# Patient Record
Sex: MALE | Race: WHITE | Employment: FULL TIME | URBAN - METROPOLITAN AREA
[De-identification: names, ages, dates, MRNs, and addresses within clinical notes are randomized per-mention and may not be internally consistent; named-entity substitution may affect disease eponyms.]

---

## 2022-03-19 PROBLEM — R94.111: Status: ACTIVE | Noted: 2022-02-15

## 2022-03-19 PROBLEM — Z23 ENCOUNTER FOR IMMUNIZATION: Status: ACTIVE | Noted: 2022-02-15

## 2022-03-19 PROBLEM — Z12.11 COLON CANCER SCREENING: Status: ACTIVE | Noted: 2022-02-15

## 2022-03-19 PROBLEM — Z12.5 PROSTATE CANCER SCREENING: Status: ACTIVE | Noted: 2022-02-15

## 2022-05-13 PROBLEM — L02.214 ABSCESS OF LEFT GROIN: Status: ACTIVE | Noted: 2022-05-13

## 2022-05-13 PROBLEM — D69.6 THROMBOCYTOPENIA (HCC): Status: ACTIVE | Noted: 2022-05-13

## 2022-06-12 PROBLEM — Z12.5 PROSTATE CANCER SCREENING: Status: RESOLVED | Noted: 2022-02-15 | Resolved: 2022-06-12

## 2022-06-16 DIAGNOSIS — Z11.59 NEED FOR HEPATITIS C SCREENING TEST: ICD-10-CM

## 2022-06-16 DIAGNOSIS — D69.6 THROMBOCYTOPENIA, UNSPECIFIED (HCC): Primary | ICD-10-CM

## 2022-06-16 DIAGNOSIS — Z79.4 TYPE 2 DIABETES MELLITUS WITHOUT COMPLICATION, WITH LONG-TERM CURRENT USE OF INSULIN (HCC): ICD-10-CM

## 2022-06-16 DIAGNOSIS — E11.9 TYPE 2 DIABETES MELLITUS WITHOUT COMPLICATION, WITH LONG-TERM CURRENT USE OF INSULIN (HCC): ICD-10-CM

## 2022-06-21 NOTE — PROGRESS NOTES
Nunu Grover MD   Bariatric & Advanced Laparoscopic Surgery & Endoscopy  [address] [facility] [address], [address] [facility]  Narciso Lordbreonna Reid  Phone [phone]   Fax [phone]      Date of visit: 2022      Primary/Requesting provider: Travis Catherine MD         Name: Debra Bay      MRN: [id number]       : 1955       Age: 61 y.o. Sex: male        PCP: Travis Catherine MD     CC:    Chief Complaint   Patient presents with    Follow-up     FU - Boil of inguinal region        HPI:    Debra Bay is a 61 y.o. male who presents for evaluation of a groin boil that appeared about 2 weeks ago. He reports the boil was getting lager in size. He reports the area was red, warm and painful. pain was 7/10. No fever or chills. He reports going to the ER this past weekend and they lanced the abscess, cleaned it and packed it. He reports having this about a year ago and was treated with antibiotics only. He is here for wound check and discuss possible chances of recurrence. 2022 - he is here for follow up. He reports his scrotal area is almost completely resolved. He completed antibiotics. He does reports a NEW problem. He was on vacation and scratch his right lower leg on a boat. He is diabetic. He rpeorts he cleaned the area immediately. He reports minimal clear drainage.        PMH:    Past Medical History:   Diagnosis Date    Diabetes mellitus, type 2 ([address]     ED (erectile dysfunction)     Hyperlipidemia associated with type 2 diabetes mellitus ([facility]     Hypertension        PSH:    Past Surgical History:   Procedure Laterality Date    WISDOM TOOTH EXTRACTION         MEDS:    Current Outpatient Medications   Medication Sig Dispense Refill    Semaglutide (OZEMPIC, 0.25 OR 0.5 MG/DOSE, SC) Inject into the skin once a week      Multiple Vitamin (MULTI-VITAMIN DAILY PO) Take by mouth daily      amoxicillin-clavulanate (AUGMENTIN) 875-125 MG per tablet Take 1 tablet by mouth 2 times daily for 7 days 14 tablet 0    atorvastatin (LIPITOR) 40 MG tablet Take by mouth daily      lisinopril (PRINIVIL;ZESTRIL) 40 MG tablet Take 40 mg by mouth daily      metFORMIN (GLUCOPHAGE) 1000 MG tablet Take 1,000 mg by mouth 2 times daily      sildenafil (VIAGRA) 100 MG tablet Take 25 mg by mouth daily as needed      SITagliptin (JANUVIA) 100 MG tablet Take 100 mg by mouth daily (Patient not taking: Reported on 6/28/2022)       No current facility-administered medications for this visit. ALLERGIES:      Allergies   Allergen Reactions    Sulfamethoxazole-Trimethoprim Swelling       SH:    Social History     Tobacco Use    Smoking status: Never Smoker    Smokeless tobacco: Never Used   Substance Use Topics    Alcohol use: Not Currently    Drug use: Never       FH:    Family History   Problem Relation Age of Onset    Heart Failure Mother     Prostate Cancer Father        Review of systems:  Review of Systems   Constitutional: Negative for chills, fatigue and fever. HENT: Negative for ear pain, sinus pain and sore throat. Eyes: Negative for photophobia, discharge and itching. Respiratory: Negative. Cardiovascular: Negative. Gastrointestinal: Negative. Endocrine: Negative. Genitourinary: Negative. Musculoskeletal: Negative. Allergic/Immunologic: Negative. Neurological: Negative. Hematological: Negative. Psychiatric/Behavioral: Negative. Physical Exam:     /73   Pulse 87   Wt 204 lb 11.2 oz (92.9 kg)   BMI 31.12 kg/m²     General:  Well-developed, well-nourished, no distress. Psych:  Cooperative, good insight and judgement. Neuro:  Alert, oriented to person, place and time. HEENT:  Normocephalic, atraumatic. Sclera clear. Lungs:  Unlabored breathing. Symmetrical chest expansion. Chest wall:  No tenderness or deformity. Heart:  Regular rate and rhythm. No JVD. Genitalia -- deferred by patient.    Extremities:  RLE with over 10 cm long scratch with an area of scab and surrounding cellulitis. + red. Mildly tender. No fluctuance  Skin:  Skin color, texture, turgor normal. No rashes. Diagnosis Orders   1. Abrasion of right lower extremity, initial encounter     2. Cellulitis of right leg           Assessment/Plan:  Julien Lennon is a 61 y.o. male who has signs and symptoms consistent with  Right lower extremity abrasion and cellulitis    Patient instructed to keep area clean and free of any lotions or chemicals. Augmentin script sent x 7 days for cellulitis. Patient should follow up with PCP if cellulitis worsens. Time: I spent 30 minutes preparing to see patient (including chart review and preparation), obtaining and/or reviewing additional medical history, performing a physical exam and evaluation, documenting clinical information in the electronic health record, independently interpreting results, communicating results to patient, family or caregiver, and/or coordinating care.       Signed: Christy Stevens MD  Bariatric & Minimally Invasive Surgery  6/28/2022 6:23 PM

## 2022-06-28 ENCOUNTER — OFFICE VISIT (OUTPATIENT)
Dept: SURGERY | Age: 67
End: 2022-06-28
Payer: COMMERCIAL

## 2022-06-28 VITALS
WEIGHT: 204.7 LBS | BODY MASS INDEX: 31.12 KG/M2 | HEART RATE: 87 BPM | DIASTOLIC BLOOD PRESSURE: 73 MMHG | SYSTOLIC BLOOD PRESSURE: 134 MMHG

## 2022-06-28 DIAGNOSIS — L03.115 CELLULITIS OF RIGHT LEG: ICD-10-CM

## 2022-06-28 DIAGNOSIS — S80.811A ABRASION OF RIGHT LOWER EXTREMITY, INITIAL ENCOUNTER: Primary | ICD-10-CM

## 2022-06-28 PROCEDURE — 99214 OFFICE O/P EST MOD 30 MIN: CPT | Performed by: SURGERY

## 2022-06-28 RX ORDER — AMOXICILLIN AND CLAVULANATE POTASSIUM 875; 125 MG/1; MG/1
1 TABLET, FILM COATED ORAL 2 TIMES DAILY
Qty: 14 TABLET | Refills: 0 | Status: SHIPPED | OUTPATIENT
Start: 2022-06-28 | End: 2022-07-05

## 2022-06-28 ASSESSMENT — ENCOUNTER SYMPTOMS
EYE ITCHING: 0
RESPIRATORY NEGATIVE: 1
SINUS PAIN: 0
ALLERGIC/IMMUNOLOGIC NEGATIVE: 1
EYE DISCHARGE: 0
SORE THROAT: 0
PHOTOPHOBIA: 0
GASTROINTESTINAL NEGATIVE: 1

## 2022-07-25 DIAGNOSIS — I10 ESSENTIAL (PRIMARY) HYPERTENSION: ICD-10-CM

## 2022-07-25 RX ORDER — LISINOPRIL 40 MG/1
TABLET ORAL
Qty: 90 TABLET | Refills: 1 | Status: SHIPPED | OUTPATIENT
Start: 2022-07-25 | End: 2022-08-05 | Stop reason: SDUPTHER

## 2022-07-29 DIAGNOSIS — Z79.4 TYPE 2 DIABETES MELLITUS WITHOUT COMPLICATION, WITH LONG-TERM CURRENT USE OF INSULIN (HCC): ICD-10-CM

## 2022-07-29 DIAGNOSIS — E11.9 TYPE 2 DIABETES MELLITUS WITHOUT COMPLICATION, WITH LONG-TERM CURRENT USE OF INSULIN (HCC): ICD-10-CM

## 2022-07-29 DIAGNOSIS — Z11.59 NEED FOR HEPATITIS C SCREENING TEST: ICD-10-CM

## 2022-07-29 DIAGNOSIS — D69.6 THROMBOCYTOPENIA, UNSPECIFIED (HCC): ICD-10-CM

## 2022-07-29 LAB
ANION GAP SERPL CALC-SCNC: 8 MMOL/L (ref 7–16)
BASOPHILS # BLD: 0.1 K/UL (ref 0–0.2)
BASOPHILS NFR BLD: 1 % (ref 0–2)
BUN SERPL-MCNC: 17 MG/DL (ref 8–23)
CALCIUM SERPL-MCNC: 9.2 MG/DL (ref 8.3–10.4)
CHLORIDE SERPL-SCNC: 102 MMOL/L (ref 98–107)
CO2 SERPL-SCNC: 25 MMOL/L (ref 21–32)
CREAT SERPL-MCNC: 1.1 MG/DL (ref 0.8–1.5)
DIFFERENTIAL METHOD BLD: ABNORMAL
EOSINOPHIL # BLD: 0.1 K/UL (ref 0–0.8)
EOSINOPHIL NFR BLD: 2 % (ref 0.5–7.8)
ERYTHROCYTE [DISTWIDTH] IN BLOOD BY AUTOMATED COUNT: 12.6 % (ref 11.9–14.6)
GLUCOSE SERPL-MCNC: 292 MG/DL (ref 65–100)
HCT VFR BLD AUTO: 46.4 % (ref 41.1–50.3)
HCV AB SER QL: NONREACTIVE
HGB BLD-MCNC: 15.2 G/DL (ref 13.6–17.2)
IMM GRANULOCYTES # BLD AUTO: 0.1 K/UL (ref 0–0.5)
IMM GRANULOCYTES NFR BLD AUTO: 1 % (ref 0–5)
LYMPHOCYTES # BLD: 1.3 K/UL (ref 0.5–4.6)
LYMPHOCYTES NFR BLD: 22 % (ref 13–44)
MCH RBC QN AUTO: 30.3 PG (ref 26.1–32.9)
MCHC RBC AUTO-ENTMCNC: 32.8 G/DL (ref 31.4–35)
MCV RBC AUTO: 92.4 FL (ref 79.6–97.8)
MONOCYTES # BLD: 0.4 K/UL (ref 0.1–1.3)
MONOCYTES NFR BLD: 7 % (ref 4–12)
NEUTS SEG # BLD: 4.2 K/UL (ref 1.7–8.2)
NEUTS SEG NFR BLD: 67 % (ref 43–78)
NRBC # BLD: 0 K/UL (ref 0–0.2)
PLATELET # BLD AUTO: 138 K/UL (ref 150–450)
PMV BLD AUTO: 11.6 FL (ref 9.4–12.3)
POTASSIUM SERPL-SCNC: 4.6 MMOL/L (ref 3.5–5.1)
RBC # BLD AUTO: 5.02 M/UL (ref 4.23–5.6)
SODIUM SERPL-SCNC: 135 MMOL/L (ref 138–145)
WBC # BLD AUTO: 6.2 K/UL (ref 4.3–11.1)

## 2022-07-30 LAB
EST. AVERAGE GLUCOSE BLD GHB EST-MCNC: 240 MG/DL
HBA1C MFR BLD: 10 % (ref 4.8–5.6)

## 2022-08-05 ENCOUNTER — OFFICE VISIT (OUTPATIENT)
Dept: INTERNAL MEDICINE CLINIC | Facility: CLINIC | Age: 67
End: 2022-08-05
Payer: COMMERCIAL

## 2022-08-05 VITALS
OXYGEN SATURATION: 98 % | TEMPERATURE: 98.6 F | HEIGHT: 68 IN | WEIGHT: 206 LBS | BODY MASS INDEX: 31.22 KG/M2 | DIASTOLIC BLOOD PRESSURE: 64 MMHG | SYSTOLIC BLOOD PRESSURE: 120 MMHG | HEART RATE: 106 BPM

## 2022-08-05 DIAGNOSIS — N49.2 SCROTAL ABSCESS: ICD-10-CM

## 2022-08-05 DIAGNOSIS — Z23 ENCOUNTER FOR IMMUNIZATION: ICD-10-CM

## 2022-08-05 DIAGNOSIS — E11.65 TYPE 2 DIABETES MELLITUS WITH HYPERGLYCEMIA, WITHOUT LONG-TERM CURRENT USE OF INSULIN (HCC): ICD-10-CM

## 2022-08-05 DIAGNOSIS — R94.111: ICD-10-CM

## 2022-08-05 DIAGNOSIS — I10 HYPERTENSION, ESSENTIAL: ICD-10-CM

## 2022-08-05 DIAGNOSIS — I10 ESSENTIAL (PRIMARY) HYPERTENSION: ICD-10-CM

## 2022-08-05 DIAGNOSIS — E78.5 HYPERLIPIDEMIA ASSOCIATED WITH TYPE 2 DIABETES MELLITUS (HCC): ICD-10-CM

## 2022-08-05 DIAGNOSIS — D69.6 THROMBOCYTOPENIA (HCC): ICD-10-CM

## 2022-08-05 DIAGNOSIS — E11.69 HYPERLIPIDEMIA ASSOCIATED WITH TYPE 2 DIABETES MELLITUS (HCC): ICD-10-CM

## 2022-08-05 DIAGNOSIS — N52.9 ERECTILE DYSFUNCTION, UNSPECIFIED ERECTILE DYSFUNCTION TYPE: Primary | ICD-10-CM

## 2022-08-05 PROCEDURE — 3046F HEMOGLOBIN A1C LEVEL >9.0%: CPT | Performed by: INTERNAL MEDICINE

## 2022-08-05 PROCEDURE — 99214 OFFICE O/P EST MOD 30 MIN: CPT | Performed by: INTERNAL MEDICINE

## 2022-08-05 RX ORDER — SEMAGLUTIDE 1.34 MG/ML
0.5 INJECTION, SOLUTION SUBCUTANEOUS WEEKLY
Qty: 1.5 ML | Refills: 5 | Status: SHIPPED | OUTPATIENT
Start: 2022-08-05 | End: 2022-08-12 | Stop reason: SDUPTHER

## 2022-08-05 RX ORDER — SILDENAFIL 100 MG/1
100 TABLET, FILM COATED ORAL DAILY PRN
Qty: 30 TABLET | Refills: 2 | Status: SHIPPED | OUTPATIENT
Start: 2022-08-05 | End: 2022-08-12 | Stop reason: SDUPTHER

## 2022-08-05 RX ORDER — ATORVASTATIN CALCIUM 40 MG/1
40 TABLET, FILM COATED ORAL DAILY
Qty: 90 TABLET | Refills: 1 | Status: SHIPPED | OUTPATIENT
Start: 2022-08-05

## 2022-08-05 RX ORDER — LISINOPRIL 40 MG/1
40 TABLET ORAL DAILY
Qty: 90 TABLET | Refills: 1 | Status: SHIPPED | OUTPATIENT
Start: 2022-08-05

## 2022-08-05 ASSESSMENT — ENCOUNTER SYMPTOMS
CHOKING: 0
VOICE CHANGE: 0
STRIDOR: 0
RECTAL PAIN: 0
EYE PAIN: 0

## 2022-08-05 NOTE — PROGRESS NOTES
FOLLOWUP VISIT    Subjective:    Mr. Lazaro Cortez is a 61 y.o., male,   Chief Complaint   Patient presents with    Follow-up Chronic Condition       HPI:    Patient presents today for follow up of two or more chronic medical problems and review of labs. The patient has hypertension. The patient has been on an attempted low sodium diet and has been trying to exercise and maintain a healthy weight. The patient reports good compliance with the blood pressure medications and good blood pressure readings on home / ambulatory monitoring. The patient has hyperlipidemia. The patient has been following a low cholesterol diet and denies any myalgias or weakness on current lipid lowering therapy. The patient has diabetes mellitus. The patient reports elevaed blood sugar readings at home. Denies any symptoms of hypo or hyperglycemia. The patient has been attempting to be compliant with an ADA diet and an exercise program.  Admits he has not been doing a good job with diet and has not been exercising. Wife is a diabetic educator and nutritionist.      Also complains of chronic left scrotal infection / boil / abscess. Has had in incised and drained in the ER and may have been seen in follow up by general surgery. \"It seems to get better for awhile but it keeps just coming back. \"    The following portions of the patient's history were reviewed and updated as appropriate:      Past Medical History:   Diagnosis Date    Diabetes mellitus, type 2 ([address]     ED (erectile dysfunction)     Hyperlipidemia associated with type 2 diabetes mellitus ([address]     Hypertension        Past Surgical History:   Procedure Laterality Date    WISDOM TOOTH EXTRACTION         Family History   Problem Relation Age of Onset    Heart Failure Mother     Prostate Cancer Father        Social History     Socioeconomic History    Marital status:      Spouse name: Not on file    Number of children: Not on file    Years of education: Not on file    Highest education level: Not on file   Occupational History    Not on file   Tobacco Use    Smoking status: Never    Smokeless tobacco: Never   Substance and Sexual Activity    Alcohol use: Not Currently    Drug use: Never    Sexual activity: Not on file   Other Topics Concern    Not on file   Social History Narrative    Not on file     Social Determinants of Health     Financial Resource Strain: Not on file   Food Insecurity: Not on file   Transportation Needs: Not on file   Physical Activity: Not on file   Stress: Not on file   Social Connections: Not on file   Intimate Partner Violence: Not on file   Housing Stability: Not on file       Current Outpatient Medications   Medication Sig Dispense Refill    lisinopril (PRINIVIL;ZESTRIL) 40 MG tablet TAKE 1 TABLET BY MOUTH EVERY DAY 90 tablet 1    Semaglutide (OZEMPIC, 0.25 OR 0.5 MG/DOSE, SC) Inject into the skin once a week      Multiple Vitamin (MULTI-VITAMIN DAILY PO) Take by mouth daily      atorvastatin (LIPITOR) 40 MG tablet Take by mouth daily      metFORMIN (GLUCOPHAGE) 1000 MG tablet Take 1,000 mg by mouth 2 times daily      sildenafil (VIAGRA) 100 MG tablet Take 25 mg by mouth daily as needed      SITagliptin (JANUVIA) 100 MG tablet Take 100 mg by mouth daily (Patient not taking: Reported on 8/5/2022)       No current facility-administered medications for this visit. Allergies as of 08/05/2022 - Fully Reviewed 06/28/2022   Allergen Reaction Noted    Sulfamethoxazole-trimethoprim Swelling 08/28/2015       Review of Systems   Constitutional:  Negative for activity change and appetite change. HENT:  Negative for drooling and voice change. Eyes:  Negative for pain. Respiratory:  Negative for choking and stridor. Gastrointestinal:  Negative for rectal pain. Endocrine: Negative for polydipsia and polyphagia. Genitourinary:  Negative for enuresis and penile pain. Musculoskeletal:  Negative for gait problem and neck stiffness. Skin:  Negative for pallor. Allergic/Immunologic: Negative for immunocompromised state. Neurological:  Negative for facial asymmetry and speech difficulty. Hematological:  Does not bruise/bleed easily. Psychiatric/Behavioral:  Negative for self-injury. The patient is not hyperactive. Patient Care Team:  Italo Galvin MD as PCP - General  Italo Galvin MD as PCP - [address] [place] [address] Dr Jameson Provider  Jaye Collins MD as Surgeon    Objective:    /64 (Site: Left Upper Arm, Position: Sitting)   Pulse (!) 106   Temp 98.6 °F (37 °C) (Temporal)   Ht 5' 8\" (1.727 m)   Wt 206 lb (93.4 kg)   SpO2 98%   BMI 31.32 kg/m²     Physical Exam  Vitals reviewed. Constitutional:       General: He is not in acute distress. Appearance: Normal appearance. He is not toxic-appearing. HENT:      Head: Normocephalic and atraumatic. Right Ear: Tympanic membrane, ear canal and external ear normal.      Left Ear: Tympanic membrane, ear canal and external ear normal.      Nose: Nose normal.      Mouth/Throat:      Mouth: Mucous membranes are moist.      Pharynx: Oropharynx is clear. Eyes:      General: No scleral icterus. Extraocular Movements: Extraocular movements intact. Conjunctiva/sclera: Conjunctivae normal.      Pupils: Pupils are equal, round, and reactive to light. Cardiovascular:      Rate and Rhythm: Normal rate and regular rhythm. Pulses: Normal pulses. Heart sounds: Normal heart sounds. Pulmonary:      Breath sounds: Normal breath sounds. Abdominal:      General: Abdomen is flat. Bowel sounds are normal.      Palpations: Abdomen is soft. There is no mass. Tenderness: There is no guarding or rebound. Genitourinary:     Comments: Chronic induration left scrotum. Musculoskeletal:         General: Normal range of motion. Cervical back: Normal range of motion and neck supple. Skin:     General: Skin is warm and dry.       Coloration: Skin is not jaundiced. Neurological:      Mental Status: He is alert and oriented to person, place, and time. Mental status is at baseline. Psychiatric:         Behavior: Behavior normal.         Thought Content: Thought content normal.            Orders Only on 07/29/2022   Component Date Value Ref Range Status    Hepatitis C Ab 07/29/2022 NONREACTIVE  NONREACTIVE   Final    Hemoglobin A1C 07/29/2022 10.0 (A) 4.8 - 5.6 % Final    eAG 07/29/2022 240  mg/dL Final    Comment: Reference Range  Normal: 4.8-5.6  Diabetic >=6.5%  Normal       <5.7%      Sodium 07/29/2022 135 (A) 138 - 145 mmol/L Final    Potassium 07/29/2022 4.6  3.5 - 5.1 mmol/L Final    Chloride 07/29/2022 102  98 - 107 mmol/L Final    CO2 07/29/2022 25  21 - 32 mmol/L Final    Anion Gap 07/29/2022 8  7 - 16 mmol/L Final    Glucose 07/29/2022 292 (A) 65 - 100 mg/dL Final    BUN 07/29/2022 17  8 - 23 MG/DL Final    Creatinine 07/29/2022 1.10  0.8 - 1.5 MG/DL Final    GFR  07/29/2022 >60  >60 ml/min/1.73m2 Final    GFR Non- 07/29/2022 >60  >60 ml/min/1.73m2 Final    Comment:   Estimated GFR is calculated using the Modification of Diet in Renal Disease (MDRD) Study equation, reported for both  Americans (GFRAA) and non- Americans (GFRNA), and normalized to 1.73m2 body surface area. The physician must decide which value applies to the patient. The MDRD study equation should only be used in individuals age 25 or older. It has not been validated for the following: pregnant women, patients with serious comorbid conditions,or on certain medications, or persons with extremes of body size, muscle mass, or nutritional status.       Calcium 07/29/2022 9.2  8.3 - 10.4 MG/DL Final    WBC 07/29/2022 6.2  4.3 - 11.1 K/uL Final    RBC 07/29/2022 5.02  4.23 - 5.6 M/uL Final    Hemoglobin 07/29/2022 15.2  13.6 - 17.2 g/dL Final    Hematocrit 07/29/2022 46.4  41.1 - 50.3 % Final    MCV 07/29/2022 92.4  79.6 - 97.8 FL Final    MCH 07/29/2022 30.3  26.1 - 32.9 PG Final    MCHC 07/29/2022 32.8  31.4 - 35.0 g/dL Final    RDW 07/29/2022 12.6  11.9 - 14.6 % Final    Platelets 75/27/7041 138 (A) 150 - 450 K/uL Final    MPV 07/29/2022 11.6  9.4 - 12.3 FL Final    nRBC 07/29/2022 0.00  0.0 - 0.2 K/uL Final    **Note: Absolute NRBC parameter is now reported with Hemogram**    Differential Type 07/29/2022 AUTOMATED    Final    Seg Neutrophils 07/29/2022 67  43 - 78 % Final    Lymphocytes 07/29/2022 22  13 - 44 % Final    Monocytes 07/29/2022 7  4.0 - 12.0 % Final    Eosinophils % 07/29/2022 2  0.5 - 7.8 % Final    Basophils 07/29/2022 1  0.0 - 2.0 % Final    Immature Granulocytes 07/29/2022 1  0.0 - 5.0 % Final    Segs Absolute 07/29/2022 4.2  1.7 - 8.2 K/UL Final    Absolute Lymph # 07/29/2022 1.3  0.5 - 4.6 K/UL Final    Absolute Mono # 07/29/2022 0.4  0.1 - 1.3 K/UL Final    Absolute Eos # 07/29/2022 0.1  0.0 - 0.8 K/UL Final    Basophils Absolute 07/29/2022 0.1  0.0 - 0.2 K/UL Final    Absolute Immature Granulocyte 07/29/2022 0.1  0.0 - 0.5 K/UL Final         Assessent & Plan:        1. Erectile dysfunction, unspecified erectile dysfunction type  Overview:  Uses Viagra PRN. Avoid nitrates. Orders:  -     sildenafil (VIAGRA) 100 MG tablet; Take 1 tablet by mouth daily as needed for Erectile Dysfunction, Disp-30 tablet, R-2Normal  2. Hypertension, essential  Overview:  Blood pressure controlled on current lisinopril. The patient will continue the current treatment. Orders:  -     lisinopril (PRINIVIL;ZESTRIL) 40 MG tablet; Take 1 tablet by mouth in the morning., Disp-90 tablet, R-1Normal  -     Comprehensive Metabolic Panel; Future  3. Hyperlipidemia associated with type 2 diabetes mellitus ([facility]  Overview:  5/9/22 total 113 HDL 43 LDL 47  on atorvastatin 40 mg daily. Labs were reviewed and discussed with the patient. The patient will continue the current treatment.         Orders:  -     atorvastatin (LIPITOR) 40 MG tablet; Take 1 tablet by mouth in the morning., Disp-90 tablet, R-1Normal  -     Lipid Panel; Future  4. Type 2 diabetes mellitus with hyperglycemia, without long-term current use of insulin ([facility])  Overview:  7/29/22 , A1C 10.0   5/9/22 , A1C 9.6, urine microalbumin negative on metformin 1000 mg BID and Januvia 100 mg daily. Labs were reviewed and discussed with the patient. Previously discussed diabetic education. .. patient states that his wife is a diabetic  and registered dietitian. His diabetes is uncontrolled despite current medications. DC Januvia. Replace with Ozempic. Repeat labs 3 months. Orders:  -     metFORMIN (GLUCOPHAGE) 1000 MG tablet; Take 1 tablet by mouth twice a day, Disp-180 tablet, R-1Normal  -     Semaglutide,0.25 or 0.5MG/DOS, (OZEMPIC, 0.25 OR 0.5 MG/DOSE,) 2 MG/1.5ML SOPN; Inject 0.5 mg into the skin once a week, Disp-1.5 mL, R-5Normal  -     Hemoglobin A1C; Future  5. Encounter for immunization  Overview:  Vaccinations reviewed / discussed. Recommended Covid booster. Recommended Shingrix vaccination. Recommended annual flu shot. Plan pneumococcal vaccination age 72.      10. Nonspecific abnormal findings on retinal function studies  Overview:  Patient states he had a nonspecific abnormal finding on retinal examination by his ophthalmologist in 2018. 5/4/18 carotid ultrasound - mild bilateral carotid plaque with no hemodynamically significant stenosis. 5/4/18 echocardiogram - normal EF, mild cLVH with mild diastolic dysfunction, bubble study negative. 7. Thrombocytopenia ([facility]  Overview:  7/29/22 platelet 472, CBC otherwise normal.  5/9/22 platelet 841, CBC otherwise normal.    The patient has a mildly low platelet count with otherwise normal CBC. Will monitor. Further evaluation if platelet count declines. 8. Essential (primary) hypertension  9. Scrotal abscess  Overview:  Patient has a recurrent / chronic left scrotal abscess.   Has been incised and drained in the ER and has spontaneously drained at home but has not resolved. Currently no fevers or chills or systemic symptoms. Refer to urology for evaluation. Orders:  -     [facility], Amelia Collins        The patient and/or patient representative voiced understanding and agreement with the current diagnoses, recommendations, and possible side effects. Return in about 3 months (around 11/5/2022) for follow up of chronic medical problems, review labs.

## 2022-08-12 ENCOUNTER — OFFICE VISIT (OUTPATIENT)
Dept: UROLOGY | Age: 67
End: 2022-08-12
Payer: COMMERCIAL

## 2022-08-12 VITALS
BODY MASS INDEX: 31.32 KG/M2 | HEIGHT: 68 IN | SYSTOLIC BLOOD PRESSURE: 129 MMHG | HEART RATE: 97 BPM | DIASTOLIC BLOOD PRESSURE: 62 MMHG

## 2022-08-12 DIAGNOSIS — N49.2 SCROTAL ABSCESS: Primary | ICD-10-CM

## 2022-08-12 DIAGNOSIS — L72.0 INCLUSION CYST: ICD-10-CM

## 2022-08-12 DIAGNOSIS — N52.9 ERECTILE DYSFUNCTION, UNSPECIFIED ERECTILE DYSFUNCTION TYPE: ICD-10-CM

## 2022-08-12 DIAGNOSIS — E11.65 TYPE 2 DIABETES MELLITUS WITH HYPERGLYCEMIA, WITHOUT LONG-TERM CURRENT USE OF INSULIN (HCC): ICD-10-CM

## 2022-08-12 PROCEDURE — 99204 OFFICE O/P NEW MOD 45 MIN: CPT | Performed by: UROLOGY

## 2022-08-12 RX ORDER — SILDENAFIL 100 MG/1
100 TABLET, FILM COATED ORAL DAILY PRN
Qty: 30 TABLET | Refills: 2 | Status: SHIPPED | OUTPATIENT
Start: 2022-08-12

## 2022-08-12 RX ORDER — SEMAGLUTIDE 1.34 MG/ML
0.5 INJECTION, SOLUTION SUBCUTANEOUS WEEKLY
Qty: 1.5 ML | Refills: 5 | Status: SHIPPED | OUTPATIENT
Start: 2022-08-12

## 2022-08-12 ASSESSMENT — ENCOUNTER SYMPTOMS
BACK PAIN: 0
NAUSEA: 0
EYE PAIN: 0
HEARTBURN: 0
COUGH: 0
SHORTNESS OF BREATH: 0
EYE DISCHARGE: 0
WHEEZING: 0
INDIGESTION: 0
SKIN LESIONS: 0
DIARRHEA: 0
VOMITING: 0
ABDOMINAL PAIN: 0
CONSTIPATION: 0
BLOOD IN STOOL: 0

## 2022-08-12 NOTE — PROGRESS NOTES
[facility]  [place], [address] [facility]  [phone]    Joaquin Reagan  : 1955      Chief Complaint   Patient presents with    Other     Scrotal Abscess        HPI   61 y.o., male who is referred by Dr. Ayse Forte for evaluation of a recurrent L scrotal abscess. Pt reports a long standing history of a recurrent L scrotal abscess over past 10+ years. Last I&D performed this spring. He cont to report mild drainage. No sig LUTS or recent fevers. SANDRA on 3/12/222 showed a L groin abscess with small L>R hydroceles. DM. Past Medical History:   Diagnosis Date    Diabetes mellitus, type 2 ([address]     ED (erectile dysfunction)     Hyperlipidemia associated with type 2 diabetes mellitus ([facility]     Hypertension      Past Surgical History:   Procedure Laterality Date    WISDOM TOOTH EXTRACTION       Current Outpatient Medications   Medication Sig Dispense Refill    Semaglutide,0.25 or 0.5MG/DOS, (OZEMPIC, 0.25 OR 0.5 MG/DOSE,) 2 MG/1.5ML SOPN Inject 0.5 mg into the skin once a week 1.5 mL 5    lisinopril (PRINIVIL;ZESTRIL) 40 MG tablet Take 1 tablet by mouth in the morning. 90 tablet 1    atorvastatin (LIPITOR) 40 MG tablet Take 1 tablet by mouth in the morning. 90 tablet 1    metFORMIN (GLUCOPHAGE) 1000 MG tablet Take 1 tablet by mouth twice a day 180 tablet 1    Multiple Vitamin (MULTI-VITAMIN DAILY PO) Take by mouth daily      sildenafil (VIAGRA) 100 MG tablet Take 1 tablet by mouth daily as needed for Erectile Dysfunction (Patient not taking: Reported on 2022) 30 tablet 2    SITagliptin (JANUVIA) 100 MG tablet Take 100 mg by mouth daily (Patient not taking: Reported on 2022)       No current facility-administered medications for this visit.      Allergies   Allergen Reactions    Sulfamethoxazole-Trimethoprim Swelling     Social History     Socioeconomic History    Marital status:      Spouse name: Not on file    Number of children: Not on file    Years of education: Not on file    Highest education level: Not on file   Occupational History    Not on file   Tobacco Use    Smoking status: Never    Smokeless tobacco: Never   Substance and Sexual Activity    Alcohol use: Not Currently    Drug use: Never    Sexual activity: Not on file   Other Topics Concern    Not on file   Social History Narrative    Not on file     Social Determinants of Health     Financial Resource Strain: Not on file   Food Insecurity: Not on file   Transportation Needs: Not on file   Physical Activity: Not on file   Stress: Not on file   Social Connections: Not on file   Intimate Partner Violence: Not on file   Housing Stability: Not on file     Family History   Problem Relation Age of Onset    Heart Failure Mother     Prostate Cancer Father          Review of Systems  Constitutional:   Negative for fever, chills, appetite change, malaise/fatigue, headaches and weight loss. Skin:  Negative for skin lesions, rash and itching. Eyes:  Negative for visual disturbance, eye pain and eye discharge. ENT:  Negative for difficulty articulating words, pain swallowing, high frequency hearing loss and dry mouth. Respiratory:  Negative for cough, blood in sputum, shortness of breath and wheezing. Cardiovascular: Positive for hypertension. Negative for chest pain, irregular heartbeat, leg pain, leg swelling, regular rate and rhythm and varicose veins. GI:  Negative for nausea, vomiting, abdominal pain, blood in stool, constipation, diarrhea, indigestion and heartburn. Genitourinary: Positive for history of urolithiasis, nocturia and erectile dysfunction. Negative for urinary burning, hematuria, flank pain, recurrent UTIs, slower stream, straining, urgency, leakage w/ urge, frequent urination, incomplete emptying, testicular pain, sexually transmitted disease, discharge and urethral stricture. Musculoskeletal:  Negative for back pain, bone pain, arthralgias, tenderness, muscle weakness and neck pain.   Neurological:

## 2022-08-16 ENCOUNTER — TELEPHONE (OUTPATIENT)
Dept: UROLOGY | Age: 67
End: 2022-08-16

## 2022-08-16 PROBLEM — L72.0 INCLUSION CYST: Status: ACTIVE | Noted: 2022-08-16

## 2022-08-18 ENCOUNTER — PREP FOR PROCEDURE (OUTPATIENT)
Dept: UROLOGY | Age: 67
End: 2022-08-18

## 2022-08-18 DIAGNOSIS — N49.2 SCROTAL ABSCESS: Primary | ICD-10-CM

## 2022-11-10 ENCOUNTER — TELEPHONE (OUTPATIENT)
Dept: UROLOGY | Age: 67
End: 2022-11-10

## 2022-11-10 ENCOUNTER — ANESTHESIA EVENT (OUTPATIENT)
Dept: SURGERY | Age: 67
End: 2022-11-10
Payer: COMMERCIAL

## 2022-11-10 NOTE — PERIOP NOTE
Directly informed patient and or family member of pre op arrival time [address] on 11/11. All questions answered. Pre op instructions reviewed. Left contact information for any additional questions or needs.

## 2022-11-10 NOTE — PERIOP NOTE
Patient verified name and . Order for consent NOT found in EHR at time of PAT visit. Unable to verify case posting against order; surgery verified by patient. Case listing does not define laterality. Patient states laterality as left. [address] in scheduling notified no laterality on case listing. Chart flagged     Type 1B surgery, PAT phone assessment complete. Orders not received. Labs per surgeon: unknown; no orders received    Labs per anesthesia protocol: POC glucose DOS    Patient answered medical/surgical history questions at their best of ability. All prior to admission medications documented in [facility]. Patient instructed to take the following medications the day of surgery according to anesthesia guidelines with a small sip of water: atorvastatin. Hold all vitamins 7 days prior to surgery and NSAIDS 5 days prior to surgery. Prescription meds to hold:vitamins and supplements, NSAIDS. Do not take lisinopril or metformin on morning of procedure. Patient instructed on the following:    > Arrive at [facility], time of arrival to be called the day before by 1700  > NPO after midnight, unless otherwise indicated, including gum, mints, and ice chips  > Responsible adult must drive patient to the hospital, stay during surgery, and patient will need supervision 24 hours after anesthesia  > Use antibacterial soap in shower the night before surgery and on the morning of surgery  > All piercings must be removed prior to arrival.    > Leave all valuables (money and jewelry) at home but bring insurance card and ID on DOS.   > You may be required to pay a deductible or co-pay on the day of your procedure. You can pre-pay by calling [phone] if your surgery is at the [facility] or [phone] if your surgery is at the [facility]. > Do not wear make-up, nail polish, lotions, cologne, perfumes, powders, or oil on skin. Artificial nails are not permitted. 0

## 2022-11-11 ENCOUNTER — HOSPITAL ENCOUNTER (OUTPATIENT)
Age: 67
Setting detail: OUTPATIENT SURGERY
Discharge: HOME | End: 2022-11-11
Attending: UROLOGY | Admitting: UROLOGY
Payer: COMMERCIAL

## 2022-11-11 ENCOUNTER — ANESTHESIA (OUTPATIENT)
Dept: SURGERY | Age: 67
End: 2022-11-11
Payer: COMMERCIAL

## 2022-11-11 VITALS
OXYGEN SATURATION: 94 % | TEMPERATURE: 97.7 F | SYSTOLIC BLOOD PRESSURE: 157 MMHG | HEART RATE: 91 BPM | WEIGHT: 204.4 LBS | HEIGHT: 68 IN | DIASTOLIC BLOOD PRESSURE: 72 MMHG | BODY MASS INDEX: 30.98 KG/M2 | RESPIRATION RATE: 18 BRPM

## 2022-11-11 DIAGNOSIS — L72.0 INCLUSION CYST: Primary | ICD-10-CM

## 2022-11-11 DIAGNOSIS — N49.2 SCROTAL ABSCESS: ICD-10-CM

## 2022-11-11 LAB
GLUCOSE BLD STRIP.AUTO-MCNC: 309 MG/DL (ref 65–100)
GLUCOSE BLD STRIP.AUTO-MCNC: 340 MG/DL (ref 65–100)
SERVICE CMNT-IMP: ABNORMAL
SERVICE CMNT-IMP: ABNORMAL

## 2022-11-11 PROCEDURE — 3600000003 HC SURGERY LEVEL 3 BASE: Performed by: UROLOGY

## 2022-11-11 PROCEDURE — 6360000002 HC RX W HCPCS: Performed by: UROLOGY

## 2022-11-11 PROCEDURE — 7100000011 HC PHASE II RECOVERY - ADDTL 15 MIN: Performed by: UROLOGY

## 2022-11-11 PROCEDURE — 11422 EXC H-F-NK-SP B9+MARG 1.1-2: CPT | Performed by: UROLOGY

## 2022-11-11 PROCEDURE — 3700000001 HC ADD 15 MINUTES (ANESTHESIA): Performed by: UROLOGY

## 2022-11-11 PROCEDURE — 2580000003 HC RX 258: Performed by: UROLOGY

## 2022-11-11 PROCEDURE — 6360000002 HC RX W HCPCS: Performed by: REGISTERED NURSE

## 2022-11-11 PROCEDURE — 6370000000 HC RX 637 (ALT 250 FOR IP): Performed by: ANESTHESIOLOGY

## 2022-11-11 PROCEDURE — 2580000003 HC RX 258: Performed by: ANESTHESIOLOGY

## 2022-11-11 PROCEDURE — 3700000000 HC ANESTHESIA ATTENDED CARE: Performed by: UROLOGY

## 2022-11-11 PROCEDURE — 7100000000 HC PACU RECOVERY - FIRST 15 MIN: Performed by: UROLOGY

## 2022-11-11 PROCEDURE — A4217 STERILE WATER/SALINE, 500 ML: HCPCS | Performed by: UROLOGY

## 2022-11-11 PROCEDURE — 2500000003 HC RX 250 WO HCPCS: Performed by: REGISTERED NURSE

## 2022-11-11 PROCEDURE — 2709999900 HC NON-CHARGEABLE SUPPLY: Performed by: UROLOGY

## 2022-11-11 PROCEDURE — 7100000001 HC PACU RECOVERY - ADDTL 15 MIN: Performed by: UROLOGY

## 2022-11-11 PROCEDURE — 7100000010 HC PHASE II RECOVERY - FIRST 15 MIN: Performed by: UROLOGY

## 2022-11-11 PROCEDURE — 3600000013 HC SURGERY LEVEL 3 ADDTL 15MIN: Performed by: UROLOGY

## 2022-11-11 PROCEDURE — 82962 GLUCOSE BLOOD TEST: CPT

## 2022-11-11 RX ORDER — ONDANSETRON 2 MG/ML
4 INJECTION INTRAMUSCULAR; INTRAVENOUS
Status: DISCONTINUED | OUTPATIENT
Start: 2022-11-11 | End: 2022-11-11 | Stop reason: HOSPADM

## 2022-11-11 RX ORDER — GLYCOPYRROLATE 0.2 MG/ML
INJECTION INTRAMUSCULAR; INTRAVENOUS PRN
Status: DISCONTINUED | OUTPATIENT
Start: 2022-11-11 | End: 2022-11-11 | Stop reason: SDUPTHER

## 2022-11-11 RX ORDER — MIDAZOLAM HYDROCHLORIDE 2 MG/2ML
2 INJECTION, SOLUTION INTRAMUSCULAR; INTRAVENOUS
Status: DISCONTINUED | OUTPATIENT
Start: 2022-11-11 | End: 2022-11-11 | Stop reason: HOSPADM

## 2022-11-11 RX ORDER — SODIUM CHLORIDE 0.9 % (FLUSH) 0.9 %
5-40 SYRINGE (ML) INJECTION PRN
Status: DISCONTINUED | OUTPATIENT
Start: 2022-11-11 | End: 2022-11-11 | Stop reason: HOSPADM

## 2022-11-11 RX ORDER — SODIUM CHLORIDE 9 MG/ML
INJECTION, SOLUTION INTRAVENOUS PRN
Status: DISCONTINUED | OUTPATIENT
Start: 2022-11-11 | End: 2022-11-11 | Stop reason: HOSPADM

## 2022-11-11 RX ORDER — SODIUM CHLORIDE 0.9 % (FLUSH) 0.9 %
5-40 SYRINGE (ML) INJECTION EVERY 12 HOURS SCHEDULED
Status: DISCONTINUED | OUTPATIENT
Start: 2022-11-11 | End: 2022-11-11 | Stop reason: HOSPADM

## 2022-11-11 RX ORDER — ACETAMINOPHEN 500 MG
1000 TABLET ORAL ONCE
Status: COMPLETED | OUTPATIENT
Start: 2022-11-11 | End: 2022-11-11

## 2022-11-11 RX ORDER — NEOSTIGMINE METHYLSULFATE 1 MG/ML
INJECTION, SOLUTION INTRAVENOUS PRN
Status: DISCONTINUED | OUTPATIENT
Start: 2022-11-11 | End: 2022-11-11 | Stop reason: SDUPTHER

## 2022-11-11 RX ORDER — OXYCODONE HYDROCHLORIDE 5 MG/1
5 TABLET ORAL
Status: DISCONTINUED | OUTPATIENT
Start: 2022-11-11 | End: 2022-11-11 | Stop reason: HOSPADM

## 2022-11-11 RX ORDER — DEXTROSE MONOHYDRATE 100 MG/ML
INJECTION, SOLUTION INTRAVENOUS CONTINUOUS PRN
Status: DISCONTINUED | OUTPATIENT
Start: 2022-11-11 | End: 2022-11-11 | Stop reason: HOSPADM

## 2022-11-11 RX ORDER — SODIUM CHLORIDE, SODIUM LACTATE, POTASSIUM CHLORIDE, CALCIUM CHLORIDE 600; 310; 30; 20 MG/100ML; MG/100ML; MG/100ML; MG/100ML
INJECTION, SOLUTION INTRAVENOUS CONTINUOUS
Status: DISCONTINUED | OUTPATIENT
Start: 2022-11-11 | End: 2022-11-11 | Stop reason: HOSPADM

## 2022-11-11 RX ORDER — HYDROCODONE BITARTRATE AND ACETAMINOPHEN 5; 325 MG/1; MG/1
1 TABLET ORAL EVERY 6 HOURS PRN
Qty: 10 TABLET | Refills: 0 | Status: SHIPPED | OUTPATIENT
Start: 2022-11-11 | End: 2022-11-14

## 2022-11-11 RX ORDER — PROPOFOL 10 MG/ML
INJECTION, EMULSION INTRAVENOUS PRN
Status: DISCONTINUED | OUTPATIENT
Start: 2022-11-11 | End: 2022-11-11 | Stop reason: SDUPTHER

## 2022-11-11 RX ORDER — HYDROMORPHONE HYDROCHLORIDE 2 MG/ML
0.5 INJECTION, SOLUTION INTRAMUSCULAR; INTRAVENOUS; SUBCUTANEOUS EVERY 5 MIN PRN
Status: DISCONTINUED | OUTPATIENT
Start: 2022-11-11 | End: 2022-11-11 | Stop reason: HOSPADM

## 2022-11-11 RX ORDER — ONDANSETRON 2 MG/ML
INJECTION INTRAMUSCULAR; INTRAVENOUS PRN
Status: DISCONTINUED | OUTPATIENT
Start: 2022-11-11 | End: 2022-11-11 | Stop reason: SDUPTHER

## 2022-11-11 RX ORDER — PROCHLORPERAZINE EDISYLATE 5 MG/ML
5 INJECTION INTRAMUSCULAR; INTRAVENOUS
Status: DISCONTINUED | OUTPATIENT
Start: 2022-11-11 | End: 2022-11-11 | Stop reason: HOSPADM

## 2022-11-11 RX ORDER — LIDOCAINE HYDROCHLORIDE 10 MG/ML
1 INJECTION, SOLUTION INFILTRATION; PERINEURAL
Status: DISCONTINUED | OUTPATIENT
Start: 2022-11-11 | End: 2022-11-11 | Stop reason: HOSPADM

## 2022-11-11 RX ORDER — LIDOCAINE HYDROCHLORIDE 20 MG/ML
INJECTION, SOLUTION EPIDURAL; INFILTRATION; INTRACAUDAL; PERINEURAL PRN
Status: DISCONTINUED | OUTPATIENT
Start: 2022-11-11 | End: 2022-11-11 | Stop reason: SDUPTHER

## 2022-11-11 RX ORDER — CEPHALEXIN 500 MG/1
500 CAPSULE ORAL 4 TIMES DAILY
Qty: 10 CAPSULE | Refills: 0 | Status: SHIPPED | OUTPATIENT
Start: 2022-11-11

## 2022-11-11 RX ORDER — DIPHENHYDRAMINE HYDROCHLORIDE 50 MG/ML
12.5 INJECTION INTRAMUSCULAR; INTRAVENOUS
Status: DISCONTINUED | OUTPATIENT
Start: 2022-11-11 | End: 2022-11-11 | Stop reason: HOSPADM

## 2022-11-11 RX ORDER — ROCURONIUM BROMIDE 10 MG/ML
INJECTION, SOLUTION INTRAVENOUS PRN
Status: DISCONTINUED | OUTPATIENT
Start: 2022-11-11 | End: 2022-11-11 | Stop reason: SDUPTHER

## 2022-11-11 RX ADMIN — FENTANYL CITRATE 50 MCG: 50 INJECTION INTRAMUSCULAR; INTRAVENOUS at 17:13

## 2022-11-11 RX ADMIN — FENTANYL CITRATE 50 MCG: 50 INJECTION INTRAMUSCULAR; INTRAVENOUS at 17:21

## 2022-11-11 RX ADMIN — ACETAMINOPHEN 1000 MG: 500 TABLET ORAL at 15:05

## 2022-11-11 RX ADMIN — INSULIN HUMAN 5 UNITS: 100 INJECTION, SOLUTION PARENTERAL at 15:02

## 2022-11-11 RX ADMIN — ONDANSETRON 4 MG: 2 INJECTION INTRAMUSCULAR; INTRAVENOUS at 17:43

## 2022-11-11 RX ADMIN — PROPOFOL 200 MG: 10 INJECTION, EMULSION INTRAVENOUS at 17:13

## 2022-11-11 RX ADMIN — GLYCOPYRROLATE 0.2 MG: 0.2 INJECTION, SOLUTION INTRAMUSCULAR; INTRAVENOUS at 17:46

## 2022-11-11 RX ADMIN — GLYCOPYRROLATE 0.2 MG: 0.2 INJECTION, SOLUTION INTRAMUSCULAR; INTRAVENOUS at 17:44

## 2022-11-11 RX ADMIN — PROPOFOL 100 MG: 10 INJECTION, EMULSION INTRAVENOUS at 17:20

## 2022-11-11 RX ADMIN — LIDOCAINE HYDROCHLORIDE 60 MG: 20 INJECTION, SOLUTION EPIDURAL; INFILTRATION; INTRACAUDAL; PERINEURAL at 17:13

## 2022-11-11 RX ADMIN — Medication 1.5 MG: at 17:44

## 2022-11-11 RX ADMIN — Medication 2 G: at 17:25

## 2022-11-11 RX ADMIN — ROCURONIUM BROMIDE 20 MG: 50 INJECTION, SOLUTION INTRAVENOUS at 17:13

## 2022-11-11 RX ADMIN — Medication 1.5 MG: at 17:46

## 2022-11-11 RX ADMIN — SODIUM CHLORIDE, POTASSIUM CHLORIDE, SODIUM LACTATE AND CALCIUM CHLORIDE: 600; 310; 30; 20 INJECTION, SOLUTION INTRAVENOUS at 15:06

## 2022-11-11 ASSESSMENT — PAIN - FUNCTIONAL ASSESSMENT
PAIN_FUNCTIONAL_ASSESSMENT: ACTIVITIES ARE NOT PREVENTED
PAIN_FUNCTIONAL_ASSESSMENT: 0-10

## 2022-11-11 ASSESSMENT — PAIN DESCRIPTION - DESCRIPTORS: DESCRIPTORS: BURNING

## 2022-11-11 ASSESSMENT — PAIN DESCRIPTION - LOCATION: LOCATION: INCISION

## 2022-11-11 ASSESSMENT — PAIN SCALES - GENERAL: PAINLEVEL_OUTOF10: 2

## 2022-11-11 NOTE — DISCHARGE INSTRUCTIONS
.     Testicular Mass: Care Instructions  Your Care Instructions    A lump or mass in your testicle may be a minor problem, or it may be more serious. Minor causes include a buildup of fluid, a cyst, or a varicose vein in your scrotum. More serious causes include infection or a tumor. You may have an ultrasound, an X-ray, or a CT scan to find out what is causing the mass. Or you may have a blood test.  Follow-up care is a key part of your treatment and safety. Be sure to make and go to all appointments, and call your doctor if you are having problems. It's also a good idea to know your test results and keep a list of the medicines you take. How can you care for yourself at home? Take your medicine exactly as prescribed. If your doctor prescribed antibiotics, take them as directed. Do not stop taking them just because you feel better. You need to take the full course of antibiotics. Be safe with medicines. Take pain medicines exactly as directed. If the doctor gave you a prescription medicine for pain, take it as prescribed. If you are not taking a prescription pain medicine, take an over-the-counter medicine such as acetaminophen (Tylenol), ibuprofen (Advil, Motrin), or naproxen (Aleve). Read and follow all instructions on the label. Do not take two or more pain medicines at the same time unless the doctor told you to. Many pain medicines have acetaminophen, which is Tylenol. Too much acetaminophen (Tylenol) can be harmful. Learn how to check your testicles so you can see if the lump changes. When should you call for help? Call your doctor now or seek immediate medical care if:    You have severe pain in a testicle. It could be a sign of a twisted testicle. This is an emergency. You have new or worsening pain in a testicle. Watch closely for changes in your health, and be sure to contact your doctor if:    You have a new or growing mass in a testicle. You see a change in a testicle.      You are not getting better as expected. Where can you learn more? Go to [email or web address] and sign in to your [facility] account. Enter A299 in the [facility] box to learn more about \"Testicular Mass: Care Instructions. \"     After general anesthesia or intravenous sedation, for 24 hours or while taking prescription Narcotics:  Limit your activities  A responsible adult needs to be with you for the next 24 hours  Do not drive and operate hazardous machinery  Do not make important personal or business decisions  Do not drink alcoholic beverages  If you have not urinated within 8 hours after discharge, and you are experiencing discomfort from urinary retention, please go to the nearest ED. If you have sleep apnea and have a CPAP machine, please use it for all naps and sleeping. Please use caution when taking narcotics and any of your home medications that may cause drowsiness. *  Please give a list of your current medications to your Primary Care Provider. *  Please update this list whenever your medications are discontinued, doses are      changed, or new medications (including over-the-counter products) are added. *  Please carry medication information at all times in case of emergency situations. These are general instructions for a healthy lifestyle:  No smoking/ No tobacco products/ Avoid exposure to second hand smoke  Surgeon General's Warning:  Quitting smoking now greatly reduces serious risk to your health. Obesity, smoking, and sedentary lifestyle greatly increases your risk for illness  A healthy diet, regular physical exercise & weight monitoring are important for maintaining a healthy lifestyle    You may be retaining fluid if you have a history of heart failure or if you experience any of the following symptoms:  Weight gain of 3 pounds or more overnight or 5 pounds in a week, increased swelling in our hands or feet or shortness of breath while lying flat in bed. Please call your doctor as soon as you notice any of these symptoms; do not wait until your next office visit.

## 2022-11-11 NOTE — ANESTHESIA PRE PROCEDURE
Department of Anesthesiology  Preprocedure Note       Name:  Joshua Asif   Age:  61 y.o.  :  1955                                          MRN:  [id number]         Date:  2022      Surgeon: Manjinder Pope):  Remy Velasco DO    Procedure: Procedure(s):  LEFT excision of scrotal cyst    Medications prior to admission:   Prior to Admission medications    Medication Sig Start Date End Date Taking? Authorizing Provider   Semaglutide,0.25 or 0.5MG/DOS, (OZEMPIC, 0.25 OR 0.5 MG/DOSE,) 2 MG/1.5ML SOPN Inject 0.5 mg into the skin once a week 22   Leila Montejo MD   sildenafil (VIAGRA) 100 MG tablet Take 1 tablet by mouth daily as needed for Erectile Dysfunction  Patient not taking: No sig reported 22   Leila Montejo MD   lisinopril (PRINIVIL;ZESTRIL) 40 MG tablet Take 1 tablet by mouth in the morning. 22   Leila Montejo MD   atorvastatin (LIPITOR) 40 MG tablet Take 1 tablet by mouth in the morning.  22   Leila Montejo MD   metFORMIN (GLUCOPHAGE) 1000 MG tablet Take 1 tablet by mouth twice a day  Patient taking differently: daily (with breakfast) Take 1 tablet by mouth daily 22   Leila Montejo MD   Multiple Vitamin (MULTI-VITAMIN DAILY PO) Take by mouth daily    Historical Provider, MD       Current medications:    Current Facility-Administered Medications   Medication Dose Route Frequency Provider Last Rate Last Admin    lidocaine 1 % injection 1 mL  1 mL IntraDERmal Once PRN Donny Washburn MD        acetaminophen (TYLENOL) tablet 1,000 mg  1,000 mg Oral Once Donny Washburn MD        lactated ringers infusion   IntraVENous Continuous Donny Washburn MD        sodium chloride flush 0.9 % injection 5-40 mL  5-40 mL IntraVENous 2 times per day Donny Washburn MD        sodium chloride flush 0.9 % injection 5-40 mL  5-40 mL IntraVENous PRN Donny Washburn MD        0.9 % sodium chloride infusion   IntraVENous PRN Donny Washburn MD        midazolam PF (VERSED) injection 2 mg  2 mg IntraVENous Once PRN Herlinda Garcia MD        insulin regular (HUMULIN R;NOVOLIN R) injection 5 Units  5 Units SubCUTAneous NOW Emilee Palomino MD           Allergies:     Allergies   Allergen Reactions    Sulfamethoxazole-Trimethoprim Swelling       Problem List:    Patient Active Problem List   Diagnosis Code    Hyperlipidemia associated with type 2 diabetes mellitus (HCC) E11.69, E78.5    Diabetes mellitus, type 2 ([facility] E11.9    ED (erectile dysfunction) N52.9    Encounter for immunization Z23    Nonspecific abnormal findings on retinal function studies R94.111    Hypertension, essential I10    Colon cancer screening Z12.11    Thrombocytopenia (HCC) D69.6    Abscess of left groin L02.214    Scrotal abscess N49.2    Inclusion cyst L72.0       Past Medical History:        Diagnosis Date    Diabetes mellitus, type 2 ([facility] [address]     type 2: ozempic and metformin; does not check blood sugar at home; no hypo sx; last A1C 8.0    ED (erectile dysfunction)     Hyperlipidemia associated with type 2 diabetes mellitus ([facility] [address]     Hypertension     medication       Past Surgical History:        Procedure Laterality Date    COLONOSCOPY      WISDOM TOOTH EXTRACTION         Social History:    Social History     Tobacco Use    Smoking status: Never    Smokeless tobacco: Never   Substance Use Topics    Alcohol use: Yes     Comment: occasional                                Counseling given: Not Answered      Vital Signs (Current):   Vitals:    11/10/22 1441 11/11/22 1359   BP:  (!) 178/78   Pulse:  82   Resp:  16   Temp:  98.6 °F (37 °C)   TempSrc:  Skin   SpO2:  98%   Weight: 207 lb (93.9 kg) 204 lb 6.4 oz (92.7 kg)   Height: 5' 8\" (1.727 m) 5' 8\" (1.727 m)                                              BP Readings from Last 3 Encounters:   11/11/22 (!) 178/78   08/12/22 129/62   08/05/22 120/64       NPO Status: Time of last liquid consumption: 2000                        Time of last solid consumption: 2000 Date of last liquid consumption: 11/10/22                        Date of last solid food consumption: 11/10/22    BMI:   Wt Readings from Last 3 Encounters:   11/11/22 204 lb 6.4 oz (92.7 kg)   08/05/22 206 lb (93.4 kg)   06/28/22 204 lb 11.2 oz (92.9 kg)     Body mass index is 31.08 kg/m².     CBC:   Lab Results   Component Value Date/Time    WBC 6.2 07/29/2022 08:54 AM    RBC 5.02 07/29/2022 08:54 AM    HGB 15.2 07/29/2022 08:54 AM    HCT 46.4 07/29/2022 08:54 AM    MCV 92.4 07/29/2022 08:54 AM    RDW 12.6 07/29/2022 08:54 AM     07/29/2022 08:54 AM       CMP:   Lab Results   Component Value Date/Time     07/29/2022 08:54 AM    K 4.6 07/29/2022 08:54 AM     07/29/2022 08:54 AM    CO2 25 07/29/2022 08:54 AM    BUN 17 07/29/2022 08:54 AM    CREATININE 1.10 07/29/2022 08:54 AM    GFRAA >60 07/29/2022 08:54 AM    AGRATIO 1.8 05/09/2022 09:45 AM    LABGLOM >60 07/29/2022 08:54 AM    GLUCOSE 292 07/29/2022 08:54 AM    PROT 6.8 05/09/2022 09:45 AM    CALCIUM 9.2 07/29/2022 08:54 AM    BILITOT 0.5 05/09/2022 09:45 AM    ALKPHOS 59 05/09/2022 09:45 AM    AST 16 05/09/2022 09:45 AM    ALT 21 05/09/2022 09:45 AM       POC Tests:   Recent Labs     11/11/22  1437   POCGLU 340*       Coags: No results found for: PROTIME, INR, APTT    HCG (If Applicable): No results found for: PREGTESTUR, PREGSERUM, HCG, HCGQUANT     ABGs: No results found for: PHART, PO2ART, MYF6OAR, AGO4IRZ, BEART, O2UGVSNR     Type & Screen (If Applicable):  No results found for: LABABO, LABRH    Drug/Infectious Status (If Applicable):  Lab Results   Component Value Date/Time    HEPCAB NONREACTIVE 07/29/2022 08:54 AM       COVID-19 Screening (If Applicable): No results found for: COVID19        Anesthesia Evaluation  Patient summary reviewed and Nursing notes reviewed no history of anesthetic complications:   Airway: Mallampati: II  TM distance: >3 FB   Neck ROM: full  Mouth opening: > = 3 FB   Dental: normal exam Pulmonary:Negative Pulmonary ROS and normal exam                               Cardiovascular:  Exercise tolerance: good (>4 METS),   (+) hypertension:, hyperlipidemia        Rhythm: regular  Rate: normal                    Neuro/Psych:   Negative Neuro/Psych ROS              GI/Hepatic/Renal: Neg GI/Hepatic/Renal ROS            Endo/Other:    (+) DiabetesType II DM, using insulin, . Abdominal:             Vascular: negative vascular ROS. Other Findings:           Anesthesia Plan      general     ASA 3       Induction: intravenous. MIPS: Postoperative opioids intended and Prophylactic antiemetics administered. Anesthetic plan and risks discussed with patient and spouse. Use of blood products discussed with patient whom consented to blood products.                      Radha Hughes MD   11/11/2022

## 2022-11-11 NOTE — BRIEF OP NOTE
Brief Postoperative Note      Patient: Isaiah Torres  YOB: 1955  MRN: [id number]    Date of Procedure: 11/11/2022    Pre-Op Diagnosis: Excision of scrotal cyst/abscess    Post-Op Diagnosis: Same       Procedure(s):  LEFT excision of scrotal cyst    Surgeon(s):  Merideth Soulier, DO    Assistant:  * No surgical staff found *    Anesthesia: General    Estimated Blood Loss (mL): <1QS    Complications: none immediate    Specimens:   * No specimens in log *    Implants:  * No implants in log *      Drains: * No LDAs found *    Findings: see op note    Electronically signed by Gordon Sharpe DO on 11/11/2022 at 5:56 PM

## 2022-11-11 NOTE — ANESTHESIA PROCEDURE NOTES
Airway  Date/Time: 11/11/2022 5:17 PM  Urgency: elective    Airway not difficult    General Information and Staff    Patient location during procedure: OR  Resident/CRNA: CHIQUITA Lemus CRNA  Performed: resident/CRNA     Indications and Patient Condition  Indications for airway management: anesthesia  Spontaneous Ventilation: absent  Sedation level: deep  Preoxygenated: yes  Patient position: sniffing  MILS not maintained throughout  Mask difficulty assessment: vent by bag mask    Final Airway Details  Final airway type: endotracheal airway      Successful airway: ETT  Cuffed: yes   Successful intubation technique: direct laryngoscopy  Facilitating devices/methods: intubating stylet and cricoid pressure  Endotracheal tube insertion site: oral  Blade: Ruthie  Blade size: #4  ETT size (mm): 8.0  Cormack-Lehane Classification: grade IIa - partial view of glottis  Placement verified by: chest auscultation and capnometry   Measured from: lips  ETT to lips (cm): 24  Number of attempts at approach: 1  Ventilation between attempts: bag mask    Additional Comments  Dentition and lips unchanged from preop

## 2022-11-11 NOTE — H&P
[facility]  [place]  [phone]    Yuni Babcock  : 1955     HPI   61 y.o., male returns in follow up for a recurrent L scrotal abscess. Pt reports a long standing history of a recurrent L scrotal abscess over past 10+ years. Last I&D performed this spring. He cont to report mild drainage. No sig LUTS or recent fevers. SANDRA on 3/12/222 showed a L groin abscess with small L>R hydroceles. DM.       Past Medical History:   Diagnosis Date    Diabetes mellitus, type 2 ([facility] [address]     type 2: ozempic and metformin; does not check blood sugar at home; no hypo sx; last A1C 8.0    ED (erectile dysfunction)     Hyperlipidemia associated with type 2 diabetes mellitus ([facility] [address]     Hypertension     medication     Past Surgical History:   Procedure Laterality Date    COLONOSCOPY      WISDOM TOOTH EXTRACTION       Current Facility-Administered Medications   Medication Dose Route Frequency Provider Last Rate Last Admin    lidocaine 1 % injection 1 mL  1 mL IntraDERmal Once PRN Dar Ren MD        lactated ringers infusion   IntraVENous Continuous Dar Ren  mL/hr at 22 1506 New Bag at 22 1506    sodium chloride flush 0.9 % injection 5-40 mL  5-40 mL IntraVENous 2 times per day Dar Ren MD        sodium chloride flush 0.9 % injection 5-40 mL  5-40 mL IntraVENous PRN Dar Ren MD        0.9 % sodium chloride infusion   IntraVENous PRN Dar Ren MD        midazolam PF (VERSED) injection 2 mg  2 mg IntraVENous Once PRN Dar Ren MD        ceFAZolin (ANCEF) 2000 mg in sterile water 20 mL IV syringe  2,000 mg IntraVENous On Call to  [place],          Allergies   Allergen Reactions    Sulfamethoxazole-Trimethoprim Swelling     Social History     Socioeconomic History    Marital status:      Spouse name: Not on file    Number of children: Not on file    Years of education: Not on file    Highest education level: Not on file   Occupational History    Not on file   Tobacco Use    Smoking status: Never    Smokeless tobacco: Never   Substance and Sexual Activity    Alcohol use: Yes     Comment: occasional    Drug use: Never    Sexual activity: Not on file   Other Topics Concern    Not on file   Social History Narrative    Not on file     Social Determinants of Health     Financial Resource Strain: Not on file   Food Insecurity: Not on file   Transportation Needs: Not on file   Physical Activity: Not on file   Stress: Not on file   Social Connections: Not on file   Intimate Partner Violence: Not on file   Housing Stability: Not on file     Family History   Problem Relation Age of Onset    Heart Failure Mother     Prostate Cancer Father        Review of Systems  All systems reviewed and are negative at this time. Physical Exam  BP (!) 178/78   Pulse 82   Temp 98.6 °F (37 °C) (Skin)   Resp 16   Ht 5' 8\" (1.727 m)   Wt 204 lb 6.4 oz (92.7 kg)   SpO2 98%   BMI 31.08 kg/m²   General appearance - alert, well appearing, and in no distress  Mental status - alert and oriented  Eyes - extraocular eye movements intact, sclera anicteric  Nose - normal and patent, no erythema or discharge  Mouth - mucous membranes moist  Chest - clear to auscultation bilaterally  Heart - normal rate, regular rhythm  Abdomen - soft, nontender, nondistended, no masses or organomegaly  Neurological - normal speech, no focal findings or movement disorder noted  Skin - normal coloration and turgor      Assessment/Plan  L scrotal abscess/cyst.  He has elected to proceed with excision of L scrotal abscess/cyst.  All risks, benefits and alternatives to the above mentioned procedure were discussed and the patient is willing to proceed at this time.       JULEE CASTLE, DO

## 2022-11-12 NOTE — ANESTHESIA POSTPROCEDURE EVALUATION
Department of Anesthesiology  Postprocedure Note    Patient: Braden Edwards  MRN: [id number]  YOB: 1955  Date of evaluation: 11/11/2022      Procedure Summary     Date: 11/11/22 Room / Location: [facility] MAIN OR 02 / [facility] MAIN OR    Anesthesia Start: 1703 Anesthesia Stop: 4300    Procedure: LEFT excision of scrotal cyst (Left: Scrotum) Diagnosis:       Inclusion cyst      (Inclusion cyst [L72.0])    Providers: Mary Pulliam DO Responsible Provider: Trevor Rachel MD    Anesthesia Type: general ASA Status: 3          Anesthesia Type: No value filed.     Wilfrido Phase I: Wilfrido Score: 10    Wilfrido Phase II:        Anesthesia Post Evaluation    Patient location during evaluation: PACU  Patient participation: complete - patient participated  Level of consciousness: awake and alert  Airway patency: patent  Nausea & Vomiting: no nausea and no vomiting  Complications: no  Cardiovascular status: hemodynamically stable  Respiratory status: acceptable, nonlabored ventilation and spontaneous ventilation  Hydration status: euvolemic  Comments: BP (!) 157/72   Pulse 91   Temp 97.7 °F (36.5 °C)   Resp 18   Ht 5' 8\" (1.727 m)   Wt 204 lb 6.4 oz (92.7 kg)   SpO2 94%   BMI 31.08 kg/m²     Multimodal analgesia pain management approach

## 2022-11-15 NOTE — OP NOTE
[facility]  OPERATIVE REPORT    Name:  Keri San  MR#:  [id number]  :  1955  ACCOUNT #:  [de-identified]  DATE OF SERVICE:  2022    PREOPERATIVE DIAGNOSIS:  Recurrent left scrotal abscess. POSTOPERATIVE DIAGNOSIS:  Recurrent left scrotal abscess. PROCEDURE PERFORMED:  Excision of left scrotal cyst/recurrent abscess. SURGEON:  Jamaica Velasco DO    ASSISTANT:  None. ANESTHESIA:  General.    COMPLICATIONS:  None immediate. SPECIMENS REMOVED:  None. IMPLANTS:  None. ESTIMATED BLOOD LOSS:  Less than 10 mL. CLINICAL HISTORY:  This is a 30-year-old gentleman who I have followed for a longstanding history of recurrent left high scrotal abscess. He reports that this has been present over the past 10 years. He did have an incision and drainage of the sepsis performed this past spring. He continues to report mild drainage. Scrotal ultrasound on 2022 shows a left groin abscess. All risks, benefits and alternatives to the above-mentioned procedure have been reviewed and he is willing to proceed at this time. PROCEDURE:  Patient consent was obtained. The patient was brought back to the operating room at which time he was placed in supine position. After the uneventful induction of general anesthesia, his genital area was prepped and draped and a sterile field applied. The cyst was palpated on the high left scrotum. An elliptical incision was made. Dissection then carried down to the subcutaneous tissues where the cyst was identified. The cyst was fully excised using a combination of sharp dissection and electrocautery. Following complete excision of the cyst, the wound was irrigated with Ancef irrigation. Hemostasis was obtained using spot electrocautery. I elected to close the incision using 3-0 chromic suture in a simple interrupted fashion. Total estimated size of the cyst was 1.5 cm.                     The patient tolerated the procedure well.  He will follow up in the office in two weeks for a postoperative check with the nurse practitioner.       [address],[facility], DO      [id number]  D:  11/15/2022 11:26  T:  11/15/2022 13:41  JOB #:  [id number]

## 2022-12-09 ENCOUNTER — OFFICE VISIT (OUTPATIENT)
Dept: UROLOGY | Age: 67
End: 2022-12-09
Payer: COMMERCIAL

## 2022-12-09 DIAGNOSIS — N49.2 SCROTAL ABSCESS: Primary | ICD-10-CM

## 2022-12-09 LAB
BILIRUBIN, URINE, POC: NEGATIVE
BLOOD URINE, POC: NEGATIVE
GLUCOSE URINE, POC: 500
KETONES, URINE, POC: NEGATIVE
LEUKOCYTE ESTERASE, URINE, POC: NEGATIVE
NITRITE, URINE, POC: NEGATIVE
PH, URINE, POC: 5.5 (ref 4.6–8)
PROTEIN,URINE, POC: NEGATIVE
SPECIFIC GRAVITY, URINE, POC: 1.02 (ref 1–1.03)
URINALYSIS CLARITY, POC: NORMAL
URINALYSIS COLOR, POC: NORMAL
UROBILINOGEN, POC: NORMAL

## 2022-12-09 PROCEDURE — 99213 OFFICE O/P EST LOW 20 MIN: CPT | Performed by: UROLOGY

## 2022-12-09 PROCEDURE — 81003 URINALYSIS AUTO W/O SCOPE: CPT | Performed by: UROLOGY

## 2022-12-09 NOTE — PROGRESS NOTES
[facility]  [place]  [phone]    Deb Patricia  : 1955     HPI   61 y.o., male returns in follow up for L scrotal cyst/abscess. S/P excision of L high scrotal groin cyst/recurrent abscess on 22. He has healed well. Reports scant drainage on occasion. Mild scrotal swelling after taking Cialis for intercourse recently that has since resolved. Past Medical History:   Diagnosis Date    Diabetes mellitus, type 2 ([address]     type 2: ozempic and metformin; does not check blood sugar at home; no hypo sx; last A1C 8.0    ED (erectile dysfunction)     Hyperlipidemia associated with type 2 diabetes mellitus ([address]     Hypertension     medication     Past Surgical History:   Procedure Laterality Date    COLONOSCOPY      PENIS BIOPSY Left 2022    LEFT excision of scrotal cyst performed by José Le DO at [facility] MAIN OR    WISDOM TOOTH EXTRACTION       Current Outpatient Medications   Medication Sig Dispense Refill    Semaglutide,0.25 or 0.5MG/DOS, (OZEMPIC, 0.25 OR 0.5 MG/DOSE,) 2 MG/1.5ML SOPN Inject 0.5 mg into the skin once a week 1.5 mL 5    lisinopril (PRINIVIL;ZESTRIL) 40 MG tablet Take 1 tablet by mouth in the morning. 90 tablet 1    atorvastatin (LIPITOR) 40 MG tablet Take 1 tablet by mouth in the morning. 90 tablet 1    metFORMIN (GLUCOPHAGE) 1000 MG tablet Take 1 tablet by mouth twice a day (Patient taking differently: daily (with breakfast) Take 1 tablet by mouth daily) 180 tablet 1    Multiple Vitamin (MULTI-VITAMIN DAILY PO) Take by mouth daily      cephALEXin (KEFLEX) 500 MG capsule Take 1 capsule by mouth 4 times daily 10 capsule 0    sildenafil (VIAGRA) 100 MG tablet Take 1 tablet by mouth daily as needed for Erectile Dysfunction (Patient not taking: No sig reported) 30 tablet 2     No current facility-administered medications for this visit.      Allergies   Allergen Reactions    Sulfamethoxazole-Trimethoprim Swelling Urobilinogen, POC 0.2 mg/dL     Nitrite, Urine, POC Negative Negative    Leukocyte Esterase, Urine, POC Negative Negative         UA - Micro  WBC - 0  RBC - 0  Bacteria - 0  Epith - 0    Assessment/Plan    ICD-10-CM    1. Scrotal abscess  N49.2 AMB POC URINALYSIS DIP STICK AUTO W/O MICRO        RTO prn.     JULEE CASTLE, DO

## 2023-01-16 DIAGNOSIS — E11.69 HYPERLIPIDEMIA ASSOCIATED WITH TYPE 2 DIABETES MELLITUS (HCC): ICD-10-CM

## 2023-01-16 DIAGNOSIS — E78.5 HYPERLIPIDEMIA ASSOCIATED WITH TYPE 2 DIABETES MELLITUS (HCC): ICD-10-CM

## 2023-01-16 RX ORDER — ATORVASTATIN CALCIUM 40 MG/1
TABLET, FILM COATED ORAL
Qty: 90 TABLET | Refills: 1 | Status: SHIPPED | OUTPATIENT
Start: 2023-01-16

## 2023-01-16 NOTE — TELEPHONE ENCOUNTER
Requested Prescriptions     Pending Prescriptions Disp Refills    atorvastatin (LIPITOR) 40 MG tablet [Pharmacy Med Name: ATORVASTATIN 40 MG TABLET] 90 tablet 1     Sig: TAKE 1 TABLET BY MOUTH EVERY DAY IN THE MORNING     Dose verified and to [facility]. Patient is scheduled for follow up visit.

## 2023-03-21 DIAGNOSIS — E11.65 TYPE 2 DIABETES MELLITUS WITH HYPERGLYCEMIA, WITHOUT LONG-TERM CURRENT USE OF INSULIN (HCC): ICD-10-CM

## 2023-03-21 NOTE — TELEPHONE ENCOUNTER
Requested Prescriptions     Pending Prescriptions Disp Refills    Semaglutide,0.25 or 0.5MG/DOS, (OZEMPIC, 0.25 OR 0.5 MG/DOSE,) 2 MG/1.5ML SOPN 1.5 mL 5     Sig: Inject 0.5 mg into the skin once a week

## 2023-03-22 RX ORDER — SEMAGLUTIDE 1.34 MG/ML
0.5 INJECTION, SOLUTION SUBCUTANEOUS WEEKLY
Qty: 1.5 ML | Refills: 5 | Status: SHIPPED | OUTPATIENT
Start: 2023-03-22

## 2023-07-18 DIAGNOSIS — E11.69 HYPERLIPIDEMIA ASSOCIATED WITH TYPE 2 DIABETES MELLITUS (HCC): ICD-10-CM

## 2023-07-18 DIAGNOSIS — E78.5 HYPERLIPIDEMIA ASSOCIATED WITH TYPE 2 DIABETES MELLITUS (HCC): ICD-10-CM

## 2023-07-18 RX ORDER — ATORVASTATIN CALCIUM 40 MG/1
TABLET, FILM COATED ORAL
Qty: 90 TABLET | Refills: 1 | Status: SHIPPED | OUTPATIENT
Start: 2023-07-18

## 2024-01-31 DIAGNOSIS — E78.5 HYPERLIPIDEMIA ASSOCIATED WITH TYPE 2 DIABETES MELLITUS (HCC): ICD-10-CM

## 2024-01-31 DIAGNOSIS — E11.69 HYPERLIPIDEMIA ASSOCIATED WITH TYPE 2 DIABETES MELLITUS (HCC): ICD-10-CM

## 2024-01-31 RX ORDER — ATORVASTATIN CALCIUM 40 MG/1
TABLET, FILM COATED ORAL
Qty: 90 TABLET | Refills: 1 | OUTPATIENT
Start: 2024-01-31

## 2025-03-05 SDOH — HEALTH STABILITY: PHYSICAL HEALTH: ON AVERAGE, HOW MANY DAYS PER WEEK DO YOU ENGAGE IN MODERATE TO STRENUOUS EXERCISE (LIKE A BRISK WALK)?: 2 DAYS

## 2025-03-05 SDOH — HEALTH STABILITY: PHYSICAL HEALTH: ON AVERAGE, HOW MANY MINUTES DO YOU ENGAGE IN EXERCISE AT THIS LEVEL?: 10 MIN

## 2025-03-07 ENCOUNTER — OFFICE VISIT (OUTPATIENT)
Dept: FAMILY MEDICINE CLINIC | Facility: CLINIC | Age: 70
End: 2025-03-07

## 2025-03-07 VITALS
SYSTOLIC BLOOD PRESSURE: 140 MMHG | OXYGEN SATURATION: 96 % | RESPIRATION RATE: 16 BRPM | WEIGHT: 209 LBS | BODY MASS INDEX: 31.67 KG/M2 | HEART RATE: 92 BPM | HEIGHT: 68 IN | TEMPERATURE: 97.1 F | DIASTOLIC BLOOD PRESSURE: 80 MMHG

## 2025-03-07 DIAGNOSIS — E66.9 OBESITY (BMI 30-39.9): ICD-10-CM

## 2025-03-07 DIAGNOSIS — E78.5 HYPERLIPIDEMIA ASSOCIATED WITH TYPE 2 DIABETES MELLITUS (HCC): ICD-10-CM

## 2025-03-07 DIAGNOSIS — E11.69 HYPERLIPIDEMIA ASSOCIATED WITH TYPE 2 DIABETES MELLITUS (HCC): ICD-10-CM

## 2025-03-07 DIAGNOSIS — Z76.89 ENCOUNTER TO ESTABLISH CARE WITH NEW DOCTOR: ICD-10-CM

## 2025-03-07 DIAGNOSIS — I10 ELEVATED BLOOD PRESSURE READING WITH DIAGNOSIS OF HYPERTENSION: ICD-10-CM

## 2025-03-07 DIAGNOSIS — N52.9 ERECTILE DYSFUNCTION, UNSPECIFIED ERECTILE DYSFUNCTION TYPE: ICD-10-CM

## 2025-03-07 DIAGNOSIS — E11.65 UNCONTROLLED TYPE 2 DIABETES MELLITUS WITH HYPERGLYCEMIA (HCC): ICD-10-CM

## 2025-03-07 DIAGNOSIS — E11.65 UNCONTROLLED TYPE 2 DIABETES MELLITUS WITH HYPERGLYCEMIA (HCC): Primary | ICD-10-CM

## 2025-03-07 PROBLEM — Z23 ENCOUNTER FOR IMMUNIZATION: Status: RESOLVED | Noted: 2022-02-15 | Resolved: 2025-03-07

## 2025-03-07 PROBLEM — L72.0 INCLUSION CYST: Status: RESOLVED | Noted: 2022-08-16 | Resolved: 2025-03-07

## 2025-03-07 PROBLEM — N49.2 SCROTAL ABSCESS: Status: RESOLVED | Noted: 2022-08-05 | Resolved: 2025-03-07

## 2025-03-07 PROBLEM — E11.9 DIABETES MELLITUS, TYPE 2 (HCC): Status: ACTIVE | Noted: 2025-03-07

## 2025-03-07 PROBLEM — R94.111: Status: RESOLVED | Noted: 2022-02-15 | Resolved: 2025-03-07

## 2025-03-07 LAB
ALBUMIN SERPL-MCNC: 4 G/DL (ref 3.2–4.6)
ALBUMIN/GLOB SERPL: 1.3 (ref 1–1.9)
ALP SERPL-CCNC: 69 U/L (ref 40–129)
ALT SERPL-CCNC: 20 U/L (ref 8–55)
ANION GAP SERPL CALC-SCNC: 16 MMOL/L (ref 7–16)
AST SERPL-CCNC: 20 U/L (ref 15–37)
BILIRUB SERPL-MCNC: 0.3 MG/DL (ref 0–1.2)
BUN SERPL-MCNC: 21 MG/DL (ref 8–23)
CALCIUM SERPL-MCNC: 10.1 MG/DL (ref 8.8–10.2)
CHLORIDE SERPL-SCNC: 98 MMOL/L (ref 98–107)
CHOLEST SERPL-MCNC: 231 MG/DL (ref 0–200)
CO2 SERPL-SCNC: 23 MMOL/L (ref 20–29)
CREAT SERPL-MCNC: 0.83 MG/DL (ref 0.8–1.3)
CREAT UR-MCNC: 39.4 MG/DL (ref 39–259)
GLOBULIN SER CALC-MCNC: 3.2 G/DL (ref 2.3–3.5)
GLUCOSE SERPL-MCNC: 309 MG/DL (ref 70–99)
HDLC SERPL-MCNC: 44 MG/DL (ref 40–60)
HDLC SERPL: 5.3 (ref 0–5)
LDLC SERPL CALC-MCNC: 123 MG/DL (ref 0–100)
MICROALBUMIN UR-MCNC: 3.25 MG/DL (ref 0–20)
MICROALBUMIN/CREAT UR-RTO: 82 MG/G (ref 0–30)
POTASSIUM SERPL-SCNC: 4.8 MMOL/L (ref 3.5–5.1)
PROT SERPL-MCNC: 7.2 G/DL (ref 6.3–8.2)
SODIUM SERPL-SCNC: 137 MMOL/L (ref 136–145)
TRIGL SERPL-MCNC: 321 MG/DL (ref 0–150)
VLDLC SERPL CALC-MCNC: 64 MG/DL (ref 6–23)

## 2025-03-07 RX ORDER — ATORVASTATIN CALCIUM 40 MG/1
40 TABLET, FILM COATED ORAL DAILY
Qty: 90 TABLET | Refills: 0 | Status: SHIPPED | OUTPATIENT
Start: 2025-03-07

## 2025-03-07 RX ORDER — LISINOPRIL 10 MG/1
10 TABLET ORAL DAILY
Qty: 90 TABLET | Refills: 1 | Status: SHIPPED | OUTPATIENT
Start: 2025-03-07

## 2025-03-07 SDOH — ECONOMIC STABILITY: FOOD INSECURITY: WITHIN THE PAST 12 MONTHS, YOU WORRIED THAT YOUR FOOD WOULD RUN OUT BEFORE YOU GOT MONEY TO BUY MORE.: NEVER TRUE

## 2025-03-07 SDOH — ECONOMIC STABILITY: FOOD INSECURITY: WITHIN THE PAST 12 MONTHS, THE FOOD YOU BOUGHT JUST DIDN'T LAST AND YOU DIDN'T HAVE MONEY TO GET MORE.: NEVER TRUE

## 2025-03-07 ASSESSMENT — PATIENT HEALTH QUESTIONNAIRE - PHQ9
SUM OF ALL RESPONSES TO PHQ QUESTIONS 1-9: 0
SUM OF ALL RESPONSES TO PHQ QUESTIONS 1-9: 0
1. LITTLE INTEREST OR PLEASURE IN DOING THINGS: NOT AT ALL
SUM OF ALL RESPONSES TO PHQ QUESTIONS 1-9: 0
2. FEELING DOWN, DEPRESSED OR HOPELESS: NOT AT ALL
SUM OF ALL RESPONSES TO PHQ QUESTIONS 1-9: 0

## 2025-03-07 NOTE — ASSESSMENT & PLAN NOTE
Chronic, not at goal (unstable), continue current plan pending work up below  Orders:    atorvastatin (LIPITOR) 40 MG tablet; Take 1 tablet by mouth daily    Lipid Panel; Future

## 2025-03-07 NOTE — PROGRESS NOTES
studies  Patient states he had a nonspecific abnormal finding on retinal examination by his ophthalmologist in 2018.    5/4/18 carotid ultrasound - mild bilateral carotid plaque with no hemodynamically significant stenosis.    5/4/18 echocardiogram - normal EF, mild cLVH with mild diastolic dysfunction, bubble study negative.    Had f/u with ophthalmology -nor further evaluations recommended    Review of Systems   Denies any HA, fevers, chills, N/V,D, chest pain or palpitations, abdominal pain, hematuria, dysuria and hematochezia    Objective   Physical Exam   General: Well appearing, well nourished, NAD.   Head: NCAT   Eyes:conjunctiva clear, sclera non-icteric  ENT: TMs translucent & mobile, hearing intact.   Nose: No lesions, septum and turbinates normal  Oral cavity : No lesions, moist mucous membranes, no exudates or tonsillar hypertrophy   Neck: Supple, without lesions  Heart: RRR, No m/r/g  No JVD  Lungs:CTAB, no wheezes or rhonchi.  Respirations even, unlabored  GI: Normal bowel sounds, non tender, non distended  Skin: Good turgor, no rash, unusual bruising or prominent lesions  Extremities: No cyanosis or edema,  peripheral pulses intact, Capillary refill <3 sec.  Musculoskeletal: ROM intact. No swelling or erythema.  Neurologic: No focal deficits. Sensation intact.    Psychiatric: AAO X3, Appropriate judgment and insight, normal mood and affect.    An electronic signature was used to authenticate this note.    --Pavan Matias, DO

## 2025-03-19 ENCOUNTER — RESULTS FOLLOW-UP (OUTPATIENT)
Dept: FAMILY MEDICINE CLINIC | Facility: CLINIC | Age: 70
End: 2025-03-19

## 2025-03-31 DIAGNOSIS — E11.65 UNCONTROLLED TYPE 2 DIABETES MELLITUS WITH HYPERGLYCEMIA (HCC): ICD-10-CM

## 2025-03-31 RX ORDER — SEMAGLUTIDE 0.68 MG/ML
INJECTION, SOLUTION SUBCUTANEOUS
OUTPATIENT
Start: 2025-03-31

## 2025-04-01 SDOH — ECONOMIC STABILITY: FOOD INSECURITY: WITHIN THE PAST 12 MONTHS, YOU WORRIED THAT YOUR FOOD WOULD RUN OUT BEFORE YOU GOT MONEY TO BUY MORE.: NEVER TRUE

## 2025-04-01 SDOH — ECONOMIC STABILITY: FOOD INSECURITY: WITHIN THE PAST 12 MONTHS, THE FOOD YOU BOUGHT JUST DIDN'T LAST AND YOU DIDN'T HAVE MONEY TO GET MORE.: NEVER TRUE

## 2025-04-01 SDOH — ECONOMIC STABILITY: INCOME INSECURITY: IN THE LAST 12 MONTHS, WAS THERE A TIME WHEN YOU WERE NOT ABLE TO PAY THE MORTGAGE OR RENT ON TIME?: NO

## 2025-04-01 SDOH — ECONOMIC STABILITY: TRANSPORTATION INSECURITY
IN THE PAST 12 MONTHS, HAS THE LACK OF TRANSPORTATION KEPT YOU FROM MEDICAL APPOINTMENTS OR FROM GETTING MEDICATIONS?: NO

## 2025-04-01 SDOH — ECONOMIC STABILITY: TRANSPORTATION INSECURITY
IN THE PAST 12 MONTHS, HAS LACK OF TRANSPORTATION KEPT YOU FROM MEETINGS, WORK, OR FROM GETTING THINGS NEEDED FOR DAILY LIVING?: NO

## 2025-04-04 ENCOUNTER — OFFICE VISIT (OUTPATIENT)
Dept: FAMILY MEDICINE CLINIC | Facility: CLINIC | Age: 70
End: 2025-04-04

## 2025-04-04 VITALS
HEIGHT: 68 IN | OXYGEN SATURATION: 98 % | HEART RATE: 90 BPM | TEMPERATURE: 97.5 F | DIASTOLIC BLOOD PRESSURE: 78 MMHG | BODY MASS INDEX: 29.86 KG/M2 | SYSTOLIC BLOOD PRESSURE: 118 MMHG | WEIGHT: 197 LBS | RESPIRATION RATE: 18 BRPM

## 2025-04-04 DIAGNOSIS — E11.69 HYPERLIPIDEMIA ASSOCIATED WITH TYPE 2 DIABETES MELLITUS: ICD-10-CM

## 2025-04-04 DIAGNOSIS — I10 ESSENTIAL HYPERTENSION: ICD-10-CM

## 2025-04-04 DIAGNOSIS — R09.89 CHEST CONGESTION: ICD-10-CM

## 2025-04-04 DIAGNOSIS — E66.9 OBESITY (BMI 30-39.9): ICD-10-CM

## 2025-04-04 DIAGNOSIS — E11.65 UNCONTROLLED TYPE 2 DIABETES MELLITUS WITH HYPERGLYCEMIA (HCC): Primary | ICD-10-CM

## 2025-04-04 DIAGNOSIS — E78.5 HYPERLIPIDEMIA ASSOCIATED WITH TYPE 2 DIABETES MELLITUS: ICD-10-CM

## 2025-04-04 DIAGNOSIS — N52.9 ERECTILE DYSFUNCTION, UNSPECIFIED ERECTILE DYSFUNCTION TYPE: ICD-10-CM

## 2025-04-04 RX ORDER — GUAIFENESIN 600 MG/1
600 TABLET, EXTENDED RELEASE ORAL 2 TIMES DAILY
Qty: 30 TABLET | Refills: 0 | Status: SHIPPED | OUTPATIENT
Start: 2025-04-04 | End: 2025-04-19

## 2025-04-04 ASSESSMENT — PATIENT HEALTH QUESTIONNAIRE - PHQ9
2. FEELING DOWN, DEPRESSED OR HOPELESS: NOT AT ALL
SUM OF ALL RESPONSES TO PHQ QUESTIONS 1-9: 0
1. LITTLE INTEREST OR PLEASURE IN DOING THINGS: NOT AT ALL
SUM OF ALL RESPONSES TO PHQ QUESTIONS 1-9: 0

## 2025-05-13 DIAGNOSIS — E11.65 UNCONTROLLED TYPE 2 DIABETES MELLITUS WITH HYPERGLYCEMIA (HCC): Primary | ICD-10-CM

## 2025-05-14 DIAGNOSIS — E11.65 UNCONTROLLED TYPE 2 DIABETES MELLITUS WITH HYPERGLYCEMIA (HCC): ICD-10-CM

## 2025-05-14 LAB
EST. AVERAGE GLUCOSE BLD GHB EST-MCNC: 163 MG/DL
HBA1C MFR BLD: 7.3 % (ref 0–5.6)

## 2025-05-16 ENCOUNTER — OFFICE VISIT (OUTPATIENT)
Dept: FAMILY MEDICINE CLINIC | Facility: CLINIC | Age: 70
End: 2025-05-16

## 2025-05-16 VITALS
RESPIRATION RATE: 18 BRPM | HEART RATE: 92 BPM | OXYGEN SATURATION: 98 % | SYSTOLIC BLOOD PRESSURE: 130 MMHG | HEIGHT: 68 IN | TEMPERATURE: 97.9 F | WEIGHT: 196 LBS | BODY MASS INDEX: 29.7 KG/M2 | DIASTOLIC BLOOD PRESSURE: 84 MMHG

## 2025-05-16 DIAGNOSIS — E78.5 HYPERLIPIDEMIA ASSOCIATED WITH TYPE 2 DIABETES MELLITUS (HCC): ICD-10-CM

## 2025-05-16 DIAGNOSIS — E11.65 UNCONTROLLED TYPE 2 DIABETES MELLITUS WITH HYPERGLYCEMIA (HCC): Primary | ICD-10-CM

## 2025-05-16 DIAGNOSIS — I10 ESSENTIAL HYPERTENSION: ICD-10-CM

## 2025-05-16 DIAGNOSIS — E66.3 OVERWEIGHT (BMI 25.0-29.9): ICD-10-CM

## 2025-05-16 DIAGNOSIS — E11.69 HYPERLIPIDEMIA ASSOCIATED WITH TYPE 2 DIABETES MELLITUS (HCC): ICD-10-CM

## 2025-05-16 RX ORDER — ATORVASTATIN CALCIUM 40 MG/1
40 TABLET, FILM COATED ORAL DAILY
Qty: 90 TABLET | Refills: 1 | Status: SHIPPED | OUTPATIENT
Start: 2025-05-16 | End: 2025-11-12

## 2025-05-16 NOTE — PROGRESS NOTES
Zaki Valencia (:  1955) is a 69 y.o. male,Established patient, here for evaluation of the following chief complaint(s):  Follow-up (F/u A1c)    Assessment & Plan  Diabetes Mellitus  - A1c improved to 7.3 from 10 in , better glycemic control  - Metabolic panel: elevated glucose, random readings in 300s  - Average glucose over 30 days 165, GMI 7.3, 74% time in range, 25% high, 1% very high, no hypoglycemic events  - BMI 28-29, avoid exceeding 30, weight loss of 13 pounds since last visit  - Continue CGM and Ozempic 1 mg  - Reduce concentrated sweets, increase dietary fiber  - Continue metformin 1000 mg twice daily    Hyperlipidemia  - Lipid panel: cholesterol 231,   - ASCVD risk 35.4%, significant risk for atherosclerosis and cardiovascular disease  - Increase atorvastatin from 40 mg to 80 mg, prescription for 180 days with one refill, take two pills at night  - Adopt natural methods to lower cholesterol: unsaturated fats, avoid saturated fats, increase soluble fiber, moderate exercise, omega-3 rich foods, plant-based diet    Hypertension  - BP well controlled at 130/84 mmHg  - Continue lisinopril 10 mg daily    Health Maintenance  - Colonoscopy done in , repeat in   The patient (or guardian, if applicable) and other individuals in attendance with the patient were advised that Artificial Intelligence will be utilized during this visit to record, process the conversation to generate a clinical note, and support improvement of the AI technology. The patient (or guardian, if applicable) and other individuals in attendance at the appointment consented to the use of AI, including the recording.    Return in about 3 months (around 2025).       Subjective   HPI  History of Present Illness  The patient is a 69-year-old male here for A1c follow-up.    He has a persistent cough but no smoking history.    He monitored his blood glucose over 14 days, averaging 158. His last A1c was 7.3,

## 2025-06-25 DIAGNOSIS — E11.65 UNCONTROLLED TYPE 2 DIABETES MELLITUS WITH HYPERGLYCEMIA (HCC): ICD-10-CM

## 2025-06-25 RX ORDER — SEMAGLUTIDE 1.34 MG/ML
1 INJECTION, SOLUTION SUBCUTANEOUS
Refills: 2 | OUTPATIENT
Start: 2025-06-25

## 2025-07-11 ENCOUNTER — OFFICE VISIT (OUTPATIENT)
Dept: FAMILY MEDICINE CLINIC | Facility: CLINIC | Age: 70
End: 2025-07-11

## 2025-07-11 ENCOUNTER — TELEPHONE (OUTPATIENT)
Dept: FAMILY MEDICINE CLINIC | Facility: CLINIC | Age: 70
End: 2025-07-11

## 2025-07-11 VITALS
RESPIRATION RATE: 18 BRPM | SYSTOLIC BLOOD PRESSURE: 128 MMHG | OXYGEN SATURATION: 96 % | DIASTOLIC BLOOD PRESSURE: 68 MMHG | HEART RATE: 96 BPM | TEMPERATURE: 97.5 F | WEIGHT: 195 LBS | BODY MASS INDEX: 29.55 KG/M2 | HEIGHT: 68 IN

## 2025-07-11 DIAGNOSIS — I10 ESSENTIAL HYPERTENSION: ICD-10-CM

## 2025-07-11 DIAGNOSIS — E11.65 UNCONTROLLED TYPE 2 DIABETES MELLITUS WITH HYPERGLYCEMIA (HCC): Primary | ICD-10-CM

## 2025-07-11 DIAGNOSIS — N52.9 ERECTILE DYSFUNCTION, UNSPECIFIED ERECTILE DYSFUNCTION TYPE: ICD-10-CM

## 2025-07-11 DIAGNOSIS — E11.69 HYPERLIPIDEMIA ASSOCIATED WITH TYPE 2 DIABETES MELLITUS (HCC): ICD-10-CM

## 2025-07-11 DIAGNOSIS — E66.3 OVERWEIGHT (BMI 25.0-29.9): ICD-10-CM

## 2025-07-11 DIAGNOSIS — E78.5 HYPERLIPIDEMIA ASSOCIATED WITH TYPE 2 DIABETES MELLITUS (HCC): ICD-10-CM

## 2025-07-11 RX ORDER — CALCIUM PHOSPHATE, TRIBASIC 250 MG
0.5 TABLET,CHEWABLE ORAL PRN
Qty: 4 EACH | Refills: 1 | Status: SHIPPED | OUTPATIENT
Start: 2025-07-11

## 2025-07-11 RX ORDER — ACYCLOVIR 400 MG/1
TABLET ORAL
Qty: 3 EACH | Refills: 5 | Status: SHIPPED | OUTPATIENT
Start: 2025-07-11

## 2025-07-11 SDOH — ECONOMIC STABILITY: FOOD INSECURITY: WITHIN THE PAST 12 MONTHS, THE FOOD YOU BOUGHT JUST DIDN'T LAST AND YOU DIDN'T HAVE MONEY TO GET MORE.: NEVER TRUE

## 2025-07-11 SDOH — ECONOMIC STABILITY: FOOD INSECURITY: WITHIN THE PAST 12 MONTHS, YOU WORRIED THAT YOUR FOOD WOULD RUN OUT BEFORE YOU GOT MONEY TO BUY MORE.: NEVER TRUE

## 2025-07-11 ASSESSMENT — PATIENT HEALTH QUESTIONNAIRE - PHQ9
SUM OF ALL RESPONSES TO PHQ QUESTIONS 1-9: 0
2. FEELING DOWN, DEPRESSED OR HOPELESS: NOT AT ALL
SUM OF ALL RESPONSES TO PHQ QUESTIONS 1-9: 0
1. LITTLE INTEREST OR PLEASURE IN DOING THINGS: NOT AT ALL

## 2025-07-11 NOTE — TELEPHONE ENCOUNTER
Told pt We don't accept [employer] Medicare HMO    Pt said he has been coming here and the ins has been paying   Glenny said ok to leave ins in the computer to file

## 2025-07-11 NOTE — PROGRESS NOTES
Zaki Valencia (:  1955) is a 69 y.o. male,Established patient, here for evaluation of the following chief complaint(s):  Follow-up    Assessment & Plan  Diabetes Mellitus:  - A1c improved to 7.3 from 10 in , indicating good glycemic control.  Interpretation of continuous glucose monitor  Reviewed patient-owned Dexcom CGM   30 days of data was analyzed.    Compliance 95%.    Average blood glucose is 189; Controlled/Persistent hyperglycemia.  Time in range 59%, high 39%, very high 5%   GMI 7.9%   Hemoglobin A1C   Date Value Ref Range Status   2025 7.3 (H) 0 - 5.6 % Final     Comment:     Reference Range  Normal       <5.7%  Prediabetes  5.7-6.4%  Diabetes     >6.4%       - Advised apple cider vinegar before meals, avoid carbohydrates first during meals, walk 20 minutes after meals.  - Prescription for Dexcom G7 sensor, three each with five refills, good for six months.    Hyperlipidemia:  - On atorvastatin 80 mg.  - Cardiovascular risk factor 34%, high.  - Continue current medication, healthy diet, exercise.    Hypertension:  - Well controlled, reading 128/68 today.  - On lisinopril 10 mg daily.  - Continue current medication, monitor BP regularly.    Erectile Dysfunction:  - Informed about Eroxon gel, OTC, single-dose tubes.  - Prescription for Eroxon gel, 1.5 g, four each with one refill, use as needed.  - No cardiac history.    Health Maintenance:  - Schedule Medicare annual wellness visit, includes cognitive function test.  - Ensure up-to-date shingles vaccine and preventive screenings.    The patient (or guardian, if applicable) and other individuals in attendance with the patient were advised that Artificial Intelligence will be utilized during this visit to record, process the conversation to generate a clinical note, and support improvement of the AI technology. The patient (or guardian, if applicable) and other individuals in attendance at the appointment consented to the use of AI,

## 2025-07-22 SDOH — HEALTH STABILITY: PHYSICAL HEALTH: ON AVERAGE, HOW MANY DAYS PER WEEK DO YOU ENGAGE IN MODERATE TO STRENUOUS EXERCISE (LIKE A BRISK WALK)?: 2 DAYS

## 2025-07-22 SDOH — HEALTH STABILITY: PHYSICAL HEALTH: ON AVERAGE, HOW MANY MINUTES DO YOU ENGAGE IN EXERCISE AT THIS LEVEL?: 20 MIN

## 2025-07-22 ASSESSMENT — PATIENT HEALTH QUESTIONNAIRE - PHQ9
2. FEELING DOWN, DEPRESSED OR HOPELESS: NOT AT ALL
SUM OF ALL RESPONSES TO PHQ QUESTIONS 1-9: 0
SUM OF ALL RESPONSES TO PHQ QUESTIONS 1-9: 0
1. LITTLE INTEREST OR PLEASURE IN DOING THINGS: NOT AT ALL
SUM OF ALL RESPONSES TO PHQ QUESTIONS 1-9: 0
SUM OF ALL RESPONSES TO PHQ QUESTIONS 1-9: 0

## 2025-07-22 ASSESSMENT — LIFESTYLE VARIABLES
HOW OFTEN DO YOU HAVE A DRINK CONTAINING ALCOHOL: 2
HOW OFTEN DO YOU HAVE A DRINK CONTAINING ALCOHOL: MONTHLY OR LESS
HOW OFTEN DO YOU HAVE SIX OR MORE DRINKS ON ONE OCCASION: 1
HOW MANY STANDARD DRINKS CONTAINING ALCOHOL DO YOU HAVE ON A TYPICAL DAY: 1
HOW MANY STANDARD DRINKS CONTAINING ALCOHOL DO YOU HAVE ON A TYPICAL DAY: 1 OR 2

## 2025-07-25 ENCOUNTER — OFFICE VISIT (OUTPATIENT)
Dept: FAMILY MEDICINE CLINIC | Facility: CLINIC | Age: 70
End: 2025-07-25

## 2025-07-25 VITALS
OXYGEN SATURATION: 99 % | SYSTOLIC BLOOD PRESSURE: 114 MMHG | HEIGHT: 68 IN | BODY MASS INDEX: 29.55 KG/M2 | TEMPERATURE: 97.4 F | WEIGHT: 195 LBS | HEART RATE: 90 BPM | DIASTOLIC BLOOD PRESSURE: 58 MMHG | RESPIRATION RATE: 18 BRPM

## 2025-07-25 DIAGNOSIS — Z00.00 INITIAL MEDICARE ANNUAL WELLNESS VISIT: ICD-10-CM

## 2025-07-25 DIAGNOSIS — Z00.00 MEDICARE ANNUAL WELLNESS VISIT, INITIAL: Primary | ICD-10-CM

## 2025-07-25 DIAGNOSIS — E11.65 UNCONTROLLED TYPE 2 DIABETES MELLITUS WITH HYPERGLYCEMIA (HCC): ICD-10-CM

## 2025-07-25 ASSESSMENT — VISUAL ACUITY
OS_CC: 20/20
OD_CC: 20/25

## 2025-07-25 NOTE — PROGRESS NOTES
Zaki Valencia (:  1955) is a 69 y.o. male,Established patient, here for evaluation of the following chief complaint(s):  Medicare AWV    Assessment & Plan  Medicare annual wellness visit:  - EKG: Possible old anterior infarct  - Shingles vaccine and pneumonia booster recommended  - Annual visual screening recommended  - Increase physical activity to 30 minutes daily or 3713-6430 steps  - Complete advanced care directive    Uncontrolled diabetes mellitus:  - Elevated blood sugar levels, decrease in time in range from 45-46% to 35-44%  - Current medications: Ozempic 1 mg weekly, metformin 1000 mg BID  - Monitor diet closely, incorporate protein-rich foods  - Engage in physical activity after meals to lower blood sugar  - No medication changes at this time  - Follow-up sooner if blood sugar remains high for potential medication adjustment    Follow-up: 10/17/2025    The patient (or guardian, if applicable) and other individuals in attendance with the patient were advised that Artificial Intelligence will be utilized during this visit to record, process the conversation to generate a clinical note, and support improvement of the AI technology. The patient (or guardian, if applicable) and other individuals in attendance at the appointment consented to the use of AI, including the recording.    Return in 1 year (on 2026) for Medicare AWV.       Subjective   HPI  History of Present Illness  69-year-old male presenting for Medicare annual wellness visit.    No history of cardiac events despite EKG indicating possible old anterior infarct. Plans daily 30-minute walk to address inactivity. Due for eye examination and intends to appoint wife as medical power of .    Elevated blood sugar levels over the past week, recent reading of 147, attributed to dietary changes including pizza consumption. Reduced intake of bread and desserts. Current medications: Ozempic 1 mg weekly, metformin 1000 mg

## 2025-09-05 DIAGNOSIS — I10 ELEVATED BLOOD PRESSURE READING WITH DIAGNOSIS OF HYPERTENSION: ICD-10-CM

## 2025-09-06 RX ORDER — LISINOPRIL 10 MG/1
10 TABLET ORAL DAILY
Qty: 90 TABLET | Refills: 1 | Status: SHIPPED | OUTPATIENT
Start: 2025-09-06

## (undated) DEVICE — PAD,NON-ADHERENT,3X8,STERILE,LF,1/PK: Brand: MEDLINE

## (undated) DEVICE — SOLUTION IRRIG 1000ML 09% SOD CHL USP PIC PLAS CONTAINER

## (undated) DEVICE — SUTURE CHROMIC GUT SZ 3-0 L27IN ABSRB BRN L26MM SH 1/2 CIR G122H

## (undated) DEVICE — SHEET, T, LAPAROTOMY, STERILE: Brand: MEDLINE

## (undated) DEVICE — MINOR SPLIT GENERAL: Brand: MEDLINE INDUSTRIES, INC.

## (undated) DEVICE — DRESSING PETRO W3XL18IN WHT GZ FOR N ADH WND CURAD

## (undated) DEVICE — BANDAGE,GAUZE,CONFORMING,2"X75",STRL,LF: Brand: MEDLINE INDUSTRIES, INC.

## (undated) DEVICE — PREMIUM WET SKIN PREP TRAY: Brand: MEDLINE INDUSTRIES, INC.

## (undated) DEVICE — BANDAGE COBAN 6 IN WND 6INX5YD FOAM